# Patient Record
Sex: FEMALE | Race: WHITE | Employment: OTHER | URBAN - METROPOLITAN AREA
[De-identification: names, ages, dates, MRNs, and addresses within clinical notes are randomized per-mention and may not be internally consistent; named-entity substitution may affect disease eponyms.]

---

## 2017-09-25 ENCOUNTER — HOSPITAL ENCOUNTER (OUTPATIENT)
Age: 67
Setting detail: OUTPATIENT SURGERY
Discharge: HOME | End: 2017-09-25
Attending: INTERNAL MEDICINE | Admitting: INTERNAL MEDICINE
Payer: MEDICARE

## 2017-09-25 ENCOUNTER — ANESTHESIA EVENT (OUTPATIENT)
Dept: ENDOSCOPY | Age: 67
End: 2017-09-25
Payer: MEDICARE

## 2017-09-25 ENCOUNTER — ANESTHESIA (OUTPATIENT)
Dept: ENDOSCOPY | Age: 67
End: 2017-09-25
Payer: MEDICARE

## 2017-09-25 VITALS
BODY MASS INDEX: 37.99 KG/M2 | SYSTOLIC BLOOD PRESSURE: 167 MMHG | TEMPERATURE: 97.8 F | WEIGHT: 228 LBS | DIASTOLIC BLOOD PRESSURE: 87 MMHG | HEIGHT: 65 IN | RESPIRATION RATE: 18 BRPM | OXYGEN SATURATION: 99 % | HEART RATE: 89 BPM

## 2017-09-25 LAB — GLUCOSE BLD STRIP.AUTO-MCNC: 128 MG/DL (ref 65–100)

## 2017-09-25 PROCEDURE — 76060000032 HC ANESTHESIA 0.5 TO 1 HR: Performed by: INTERNAL MEDICINE

## 2017-09-25 PROCEDURE — 74011000250 HC RX REV CODE- 250

## 2017-09-25 PROCEDURE — 74011000250 HC RX REV CODE- 250: Performed by: ANESTHESIOLOGY

## 2017-09-25 PROCEDURE — 88312 SPECIAL STAINS GROUP 1: CPT | Performed by: INTERNAL MEDICINE

## 2017-09-25 PROCEDURE — 76040000025: Performed by: INTERNAL MEDICINE

## 2017-09-25 PROCEDURE — 74011250636 HC RX REV CODE- 250/636

## 2017-09-25 PROCEDURE — 88305 TISSUE EXAM BY PATHOLOGIST: CPT | Performed by: INTERNAL MEDICINE

## 2017-09-25 PROCEDURE — 82962 GLUCOSE BLOOD TEST: CPT

## 2017-09-25 PROCEDURE — 74011250636 HC RX REV CODE- 250/636: Performed by: ANESTHESIOLOGY

## 2017-09-25 PROCEDURE — 77030009426 HC FCPS BIOP ENDOSC BSC -B: Performed by: INTERNAL MEDICINE

## 2017-09-25 RX ORDER — LIDOCAINE HYDROCHLORIDE 20 MG/ML
INJECTION, SOLUTION EPIDURAL; INFILTRATION; INTRACAUDAL; PERINEURAL AS NEEDED
Status: DISCONTINUED | OUTPATIENT
Start: 2017-09-25 | End: 2017-09-25 | Stop reason: HOSPADM

## 2017-09-25 RX ORDER — PROPOFOL 10 MG/ML
INJECTION, EMULSION INTRAVENOUS
Status: DISCONTINUED | OUTPATIENT
Start: 2017-09-25 | End: 2017-09-25 | Stop reason: HOSPADM

## 2017-09-25 RX ORDER — SODIUM CHLORIDE 0.9 % (FLUSH) 0.9 %
5-10 SYRINGE (ML) INJECTION AS NEEDED
Status: CANCELLED | OUTPATIENT
Start: 2017-09-25

## 2017-09-25 RX ORDER — SODIUM CHLORIDE, SODIUM LACTATE, POTASSIUM CHLORIDE, CALCIUM CHLORIDE 600; 310; 30; 20 MG/100ML; MG/100ML; MG/100ML; MG/100ML
100 INJECTION, SOLUTION INTRAVENOUS CONTINUOUS
Status: CANCELLED | OUTPATIENT
Start: 2017-09-25

## 2017-09-25 RX ORDER — SODIUM CHLORIDE, SODIUM LACTATE, POTASSIUM CHLORIDE, CALCIUM CHLORIDE 600; 310; 30; 20 MG/100ML; MG/100ML; MG/100ML; MG/100ML
100 INJECTION, SOLUTION INTRAVENOUS CONTINUOUS
Status: DISCONTINUED | OUTPATIENT
Start: 2017-09-25 | End: 2017-09-25 | Stop reason: HOSPADM

## 2017-09-25 RX ORDER — PROPOFOL 10 MG/ML
INJECTION, EMULSION INTRAVENOUS AS NEEDED
Status: DISCONTINUED | OUTPATIENT
Start: 2017-09-25 | End: 2017-09-25 | Stop reason: HOSPADM

## 2017-09-25 RX ADMIN — SODIUM CHLORIDE, SODIUM LACTATE, POTASSIUM CHLORIDE, AND CALCIUM CHLORIDE 100 ML/HR: 600; 310; 30; 20 INJECTION, SOLUTION INTRAVENOUS at 09:59

## 2017-09-25 RX ADMIN — FAMOTIDINE 20 MG: 10 INJECTION, SOLUTION INTRAVENOUS at 10:15

## 2017-09-25 RX ADMIN — LIDOCAINE HYDROCHLORIDE 100 MG: 20 INJECTION, SOLUTION EPIDURAL; INFILTRATION; INTRACAUDAL; PERINEURAL at 11:29

## 2017-09-25 RX ADMIN — PROPOFOL 200 MCG/KG/MIN: 10 INJECTION, EMULSION INTRAVENOUS at 11:29

## 2017-09-25 RX ADMIN — PROPOFOL 50 MG: 10 INJECTION, EMULSION INTRAVENOUS at 11:29

## 2017-09-25 NOTE — H&P
Gastroenterology Outpatient History and Physical    Patient: Mckenzie Guerrier    Physician: Catalina Bhatti MD    Vital Signs: Blood pressure (!) 175/94, pulse 90, temperature 97.8 °F (36.6 °C), resp. rate 16, height 5' 5\" (1.651 m), weight 103.4 kg (228 lb), SpO2 97 %. Allergies: Allergies   Allergen Reactions    Pcn [Penicillins] Anaphylaxis    Sulfa (Sulfonamide Antibiotics) Other (comments)     Gray-Bob syndrome    Advil [Ibuprofen] Nausea Only     Rash    Celebrex [Celecoxib] Shortness of Breath    Pneumovax 23 [Pneumococcal 23-Pallavi Ps Vaccine] Swelling       Chief Complaint: GERD, Epigastric pain, dysphagia  rectal pain, Fe deficiency. Altered BMs, Hx of colon polyps    History of Present Illness: As Above    Justification for Procedure: As Above    History:  Past Medical History:   Diagnosis Date    Anemia     iron deficiency. getting iron infusions--Dr. Ambar Carreno Arrhythmia     pt states \" fast HR\"--but unsure if anything else--- followed by [facility] cardio    Chronic pain     COPD (chronic obstructive pulmonary disease) ([facility] [address] \"years\"    centrilobular emphysema with air trapping. daily inhaler--- home O2 prn--- followed by dr Lyudmila Sood [facility]) 2007    type2-- sqbs avg am-- --- no hypo    GERD (gastroesophageal reflux disease) \"years\"    controlled with med    History of echocardiogram 02/02/2017    EF 88-11%, grade 1 diastolic dysfnction. no significant valve disease, normal RV size and function    History of nuclear stress test 08/22/2017    Findings consistent with no ischemia. Diaphragmatic attenuation is noted.  Hyperlipidemia \"years\"    Hypertension     controlled with med    Joint pain     back, right shoulder, knees    Osteoarthritis of right knee 11/30/2010    PAD (peripheral artery disease) ([facility])     Palpitations     Holter monitor 8/16/17--rhythm is sinus tachycardia. Avg. heart rate 106 bpm, minimum is 91 bpm.  Isolated APDs and couplets.   Isolated nonsustained PAT and VPDs. No complex ventricular arrhythmia.  Pneumonia hospitalized May, June 2010    Renal insufficiency     Followed by [facility] (Dr. Judy Nguyen)---- NOT SEEN IN 10 YRS -- PER PT    Sleep apnea     refuses to use CPAP    Thyroid nodule     followed by [facility]      Past Surgical History:   Procedure Laterality Date    ABDOMEN SURGERY PROC UNLISTED  2010    CHOLECYSTECTOMY    HC BLD CARPEL TUNNEL RELEASE      Bilateral    HX BREAST AUGMENTATION  2005    reduction    HX GYN      Chronic UTIs, Has had multiple cystoscopes    HX HYSTERECTOMY      HX ORTHOPAEDIC  1991    lt. broken foot repair/metal plate-pins    HX ORTHOPAEDIC  2009    RIGHTKNEE REPLACEMENT    HX SALPINGO-OOPHORECTOMY  1990s    HX TOTAL ABDOMINAL HYSTERECTOMY  1970s    HX UROLOGICAL  1990s    bladder tacking    LAP,CHOLECYSTECTOMY  2001    REPAIR OF RECTOCELE  1990s      Social History     Social History    Marital status:      Spouse name: N/A    Number of children: N/A    Years of education: N/A     Social History Main Topics    Smoking status: Former Smoker     Packs/day: 0.50     Years: 30.00     Types: Cigarettes     Quit date: 1/18/1995    Smokeless tobacco: Never Used    Alcohol use No    Drug use: No    Sexual activity: Not Asked     Other Topics Concern    None     Social History Narrative      Family History   Problem Relation Age of Onset    Heart Disease Father     Cancer Father     Malignant Hyperthermia Neg Hx     Pseudocholinesterase Deficiency Neg Hx     Delayed Awakening Neg Hx     Post-op Nausea/Vomiting Neg Hx     Emergence Delirium Neg Hx     Post-op Cognitive Dysfunction Neg Hx     Other Neg Hx        Medications:   Prior to Admission medications    Medication Sig Start Date End Date Taking? Authorizing Provider   allopurinol (ZYLOPRIM) 100 mg tablet Take 100 mg by mouth every morning.    Yes Historical Provider   fluticasone (FLONASE) 50 mcg/actuation nasal spray 2 Sprays by Both Nostrils route two (2) times a day. Yes Historical Provider   Iron Fum & PS Cmp-FA-Vit C-B3 (INTEGRA F) 125-1-40-3 mg cap Take 1 Tab by mouth two (2) times a day. Yes Historical Provider   isosorbide dinitrate (ISORDIL) 30 mg tablet Take 20 mg by mouth every morning. Yes Historical Provider   montelukast (SINGULAIR) 5 mg chewable tablet Take 5 mg by mouth every morning. Pt states she takes a childrens dose of 5 mg daily   Yes Historical Provider   amLODIPine (NORVASC) 5 mg tablet Take 5 mg by mouth every morning. Yes Historical Provider   tiotropium (SPIRIVA WITH HANDIHALER) 18 mcg inhalation capsule Take 1 Cap by inhalation every morning. Yes Historical Provider   levalbuterol tartrate (XOPENEX HFA) 45 mcg/actuation inhaler Take 2 Puffs by inhalation two (2) times a day. Yes Historical Provider   HYDROcodone-acetaminophen (NORCO)  mg tablet Take 1 Tab by mouth three (3) times daily. Yes Historical Provider   Omeprazole delayed release (PRILOSEC D/R) 20 mg tablet Take 40 mg by mouth every morning. Indications: gastroesophageal reflux disease   Yes Historical Provider   furosemide (LASIX) 40 mg tablet Take 20 mg by mouth every morning. 4/18/10  Yes Historical Provider   OTHER,NON-FORMULARY, Take 150 mg by mouth every twenty-eight (28) days. Xolair 150 mg, subcutaneous every 4 weeks  Indications: FOR COPD 4/17/10  Yes Historical Provider   dilTIAZem (CARDIZEM) 30 mg tablet Take 30 mg by mouth two (2) times a day. Historical Provider   metFORMIN (GLUCOPHAGE) 500 mg tablet Take 500 mg by mouth two (2) times daily (with meals). Historical Provider   albuterol (PROVENTIL HFA, VENTOLIN HFA, PROAIR HFA) 90 mcg/actuation inhaler Take 2 Puffs by inhalation every four (4) hours as needed for Wheezing.     Historical Provider       Physical Exam:         Heart: regular rate and rhythm, S1, S2 normal, no murmur, click, rub or gallop   Lungs: chest clear, no wheezing, rales, normal symmetric air entry, Heart exam - S1, S2 normal, no murmur, no gallop, rate regular   Abdominal: Bowel sounds are normal, liver is not enlarged, spleen is not enlarged           Findings/Diagnosis:  Dysphagia, epigastric pain, Fe deficiency, Hx of colon polyps, Altered BMs        Signed:  Tahira Hernandez MD 9/25/2017

## 2017-09-25 NOTE — PROCEDURES
[facility]   PROCEDURE NOTE       Name:  Chitra Whatley   MR#:  [id number]   :  1950   Account #:  [de-identified]   Date of Adm:  2017       DATE OF PROCEDURE: 2017    REFERRING PHYSICIAN: Chaya Conner MD.     PROCEDURE: Esophagogastroduodenoscopy. PREOPERATIVE DIAGNOSES: Dysphagia, gastroesophageal reflux   disease, epigastric pain and iron deficiency. POSTOPERATIVE DIAGNOSIS: Mild gastritis noted, otherwise   negative examination. ANESTHESIA: TIVA. INSTRUMENT: GIF-H190. FINDINGS: The patient was in the EGD position. Using standard   technique, the upper endoscope was inserted and passed to the   hypopharynx. This area appeared normal. The esophagus was easily   entered. All views in the esophagus were normal without ulcers,   erosions or varices. At the end of the procedure. The esophagus   was dilated empirically with a 56-[place] Yost dilator without   difficulty. The gastric lumen was, there was mild gastritis. Biopsies were taken mostly in the antrum. Retroflexed view   showed no varices and there were no unusual masses, no   significant retained liquid or debris. The pylorus was patent. The Duodenal bulb, second and proximal third portion appeared   normal. Small bowel biopsies were taken to evaluate the iron   deficiency anemia to look for celiac. The scope was retracted. Air was taken on the way back. The patient tolerated the   procedure well. Blood loss was minimal.      SUGGEST:   1. Followup biopsies. 2. Continue omeprazole 40 mg a day.               MD NATY Zaman / Dariana Wood   D:  2017   12:07   T:  2017   12:47   Job #:  [id number]

## 2017-09-25 NOTE — DISCHARGE INSTRUCTIONS
Gastrointestinal Esophagogastroduodenoscopy (EGD) - Upper Exam Discharge Instructions    1. Call Dr. Nicholas Figueroa at [phone] for any problems or questions. 2. Contact the doctor's office for follow up appointment as directed. 3. Medication may cause drowsiness for several hours, therefore, do not drive or operate machinery for remainder of the day. 4. No alcohol today. 5. Ordinarily, you may resume regular diet and activity after exam unless otherwise  specified by your physician. 6. For mild soreness in your throat you may use  throat lozenges or warm  salt-water gargles as needed. Any additional instructions: Follow up as needed       Gastrointestinal Colonoscopy/Flexible Sigmoidoscopy - Lower Exam Discharge Instructions  1. Call Dr. Nicholas Figueroa at [phone] for any problems or questions. 2. Contact the doctors office for follow up appointment as directed  3. Medication may cause drowsiness for several hours, therefore, do not drive or operate machinery for remainder of the day. 4. No alcohol today. 5. Ordinarily, you may resume regular diet and activity after exam unless otherwise specified by your physician. 6. Because of air put into your colon during exam, you may experience some abdominal distension, relieved by the passage of gas, for several hours. 7. Contact your physician if you have any of the following:  a. Excessive amount of bleeding - large amount when having a bowel movement. Occasional specks of blood with bowel movement would not be unusual.  b. Severe abdominal pain  c. Fever or Chills    Any additional instructions:   Follow up as needed

## 2017-09-25 NOTE — IP AVS SNAPSHOT
[facility] 
 
 
 [address] 
[phone] Patient: Israel Drake MRN: [id number] OCM:0/30/5770 You are allergic to the following Allergen Reactions Pcn (Penicillins) Anaphylaxis Sulfa (Sulfonamide Antibiotics) Other (comments) Gray-Bob syndrome Advil (Ibuprofen) Nausea Only Rash Celebrex (Celecoxib) Shortness of Breath Pneumovax 23 (Pneumococcal 23-Pallavi Ps Vaccine) Swelling Recent Documentation Height Weight BMI OB Status Smoking Status 1.651 m 103.4 kg 37.94 kg/m2 Postmenopausal Former Smoker Emergency Contacts Name Discharge Info Relation Home Work Mobile Warner Fierro  Spouse [3] [phone] About your hospitalization You were admitted on:  September 25, 2017 You last received care in the:  [facility] You were discharged on:  September 25, 2017 Unit phone number:  [phone] Why you were hospitalized Your primary diagnosis was:  Not on File Providers Seen During Your Hospitalizations Provider Role Specialty Primary office phone Velia Isidro MD Attending Provider Gastroenterology [phone] Your Primary Care Physician (PCP) Primary Care Physician Office Phone Office Fax Jomar Wilson [phone] Follow-up Information None Current Discharge Medication List  
  
ASK your doctor about these medications Dose & Instructions Dispensing Information Comments Morning Noon Evening Bedtime  
 albuterol 90 mcg/actuation inhaler Commonly known as:  PROVENTIL HFA, VENTOLIN HFA, PROAIR HFA Your last dose was: Your next dose is:    
   
   
 Dose:  2 Puff Take 2 Puffs by inhalation every four (4) hours as needed for Wheezing. Refills:  0  
     
   
   
   
  
 allopurinol 100 mg tablet Commonly known as:  Avelina Minor  
   
 Your last dose was: Your next dose is:    
   
   
 Dose:  100 mg Take 100 mg by mouth every morning. Refills:  0 CARDIZEM 30 mg tablet Generic drug:  dilTIAZem Your last dose was: Your next dose is:    
   
   
 Dose:  30 mg Take 30 mg by mouth two (2) times a day. Refills:  0  
     
   
   
   
  
 FLONASE 50 mcg/actuation nasal spray Generic drug:  fluticasone Your last dose was: Your next dose is:    
   
   
 Dose:  2 Spray 2 Sprays by Both Nostrils route two (2) times a day. Refills:  0 HYDROcodone-acetaminophen  mg tablet Commonly known as:  Tevin Handler Your last dose was: Your next dose is:    
   
   
 Dose:  1 Tab Take 1 Tab by mouth three (3) times daily. Refills:  0 INTEGRA F 125-1-40-3 mg Cap Generic drug:  Iron Fum & PS Cmp-FA-Vit C-B3 Your last dose was: Your next dose is:    
   
   
 Dose:  1 Tab Take 1 Tab by mouth two (2) times a day. Refills:  0  
     
   
   
   
  
 isosorbide dinitrate 30 mg tablet Commonly known as:  ISORDIL Your last dose was: Your next dose is:    
   
   
 Dose:  20 mg Take 20 mg by mouth every morning. Refills:  0  
     
   
   
   
  
 LASIX 40 mg tablet Generic drug:  furosemide Your last dose was: Your next dose is:    
   
   
 Dose:  20 mg Take 20 mg by mouth every morning. Refills:  0  
     
   
   
   
  
 metFORMIN 500 mg tablet Commonly known as:  GLUCOPHAGE Your last dose was: Your next dose is:    
   
   
 Dose:  500 mg Take 500 mg by mouth two (2) times daily (with meals). Refills:  0  
     
   
   
   
  
 montelukast 5 mg chewable tablet Commonly known as:  SINGULAIR Your last dose was: Your next dose is:    
   
   
 Dose:  5 mg Take 5 mg by mouth every morning.  Pt states she takes a childrens dose of 5 mg daily Refills:  0 NORVASC 5 mg tablet Generic drug:  amLODIPine Your last dose was: Your next dose is:    
   
   
 Dose:  5 mg Take 5 mg by mouth every morning. Refills:  0 Omeprazole delayed release 20 mg tablet Commonly known as:  PRILOSEC D/R Your last dose was: Your next dose is:    
   
   
 Dose:  40 mg Take 40 mg by mouth every morning. Indications: gastroesophageal reflux disease Refills:  0  
     
   
   
   
  
 OTHER(NON-FORMULARY) Your last dose was: Your next dose is:    
   
   
 Dose:  150 mg Take 150 mg by mouth every twenty-eight (28) days. Xolair 150 mg, subcutaneous every 4 weeks  Indications: FOR COPD Refills:  0 SPIRIVA WITH HANDIHALER 18 mcg inhalation capsule Generic drug:  tiotropium Your last dose was: Your next dose is:    
   
   
 Dose:  1 Cap Take 1 Cap by inhalation every morning. Refills:  0  
     
   
   
   
  
 XOPENEX HFA 45 mcg/actuation inhaler Generic drug:  levalbuterol tartrate Your last dose was: Your next dose is:    
   
   
 Dose:  2 Puff Take 2 Puffs by inhalation two (2) times a day. Refills:  0 Discharge Instructions Gastrointestinal Esophagogastroduodenoscopy (EGD) - Upper Exam Discharge Instructions 1. Call Dr. Nicholas Figueroa at [phone] for any problems or questions. 2. Contact the doctor's office for follow up appointment as directed. 3. Medication may cause drowsiness for several hours, therefore, do not drive or operate machinery for remainder of the day. 4. No alcohol today. 5. Ordinarily, you may resume regular diet and activity after exam unless otherwise  specified by your physician. 6. For mild soreness in your throat you may use  throat lozenges or warm  salt-water gargles as needed. Any additional instructions: Follow up as needed Gastrointestinal Colonoscopy/Flexible Sigmoidoscopy - Lower Exam Discharge Instructions 1. Call Dr. Tali Partida at [phone] for any problems or questions. 2. Contact the doctors office for follow up appointment as directed 3. Medication may cause drowsiness for several hours, therefore, do not drive or operate machinery for remainder of the day. 4. No alcohol today. 5. Ordinarily, you may resume regular diet and activity after exam unless otherwise specified by your physician. 6. Because of air put into your colon during exam, you may experience some abdominal distension, relieved by the passage of gas, for several hours. 7. Contact your physician if you have any of the following: 
a. Excessive amount of bleeding  large amount when having a bowel movement. Occasional specks of blood with bowel movement would not be unusual. 
b. Severe abdominal pain 
c. Fever or Chills Any additional instructions: Follow up as needed Discharge Orders None Introducing [facility]! Josr Chamberlain introduces [employer] patient portal. Now you can access parts of your medical record, email your doctor's office, and request medication refills online. 1. In your internet browser, go to [email or web address] ([email or web address] ([email or web address] mi) 2. Click on the First Time User? Click Here link in the Sign In box. You will see the New Member Sign Up page. 3. Enter your [employer] Access Code exactly as it appears below. You will not need to use this code after youve completed the sign-up process. If you do not sign up before the expiration date, you must request a new code. · [employer] Access Code: [email or web address] Expires: 12/20/2017 11:07 AM 
 
4. Enter the last four digits of your Social Security Number (xxxx) and Date of Birth (mm/dd/yyyy) as indicated and click Submit. You will be taken to the next sign-up page. 5. Create a [employer] ID. This will be your [employer] login ID and cannot be changed, so think of one that is secure and easy to remember. 6. Create a [employer] password. You can change your password at any time. 7. Enter your Password Reset Question and Answer. This can be used at a later time if you forget your password. 8. Enter your e-mail address. You will receive e-mail notification when new information is available in [address] 9. Click Sign Up. You can now view and download portions of your medical record. 10. Click the Download Summary menu link to download a portable copy of your medical information. If you have questions, please visit the Frequently Asked Questions section of the [employer] website. Remember, [employer] is NOT to be used for urgent needs. For medical emergencies, dial 911. Now available from your iPhone and Android! General Information Please provide this summary of care documentation to your next provider. Patient Signature:  ____________________________________________________________ Date:  ____________________________________________________________  
  
Rivera Gould Provider Signature:  ____________________________________________________________ Date:  ____________________________________________________________

## 2017-09-25 NOTE — ROUTINE PROCESS
Pt. Discharged to car by Sandra Soares with family . Vital signs stable. Able to tolerate PO fluids. Passing gas.  Seen by MD.

## 2017-09-25 NOTE — PROCEDURES
[facility]   PROCEDURE NOTE       Name:  Albert Brody   MR#:  [id number]   :  1950   Account #:  [de-identified]   Date of Adm:  2017       DATE OF PROCEDURE: 2017     REFERRING PHYSICIAN: Maurilio Thao MD.    PROCEDURE: Colonoscopy. PREOPERATIVE DIAGNOSES: Iron deficiency anemia, history of   polyps, altered bowel movements. POSTOPERATIVE DIAGNOSES:    1. Diverticulosis extending 3/4 of the way around the colon. 2. Benign appearing submucosal nodule/polyp in the ascending   colon, biopsied. 3. Internal hemorrhoids. No source of iron deficiency anemia   identified. PROCEDURE: After informed consent, the pediatric colonoscope was   lubricated and inserted into the anal canal after an otherwise   normal rectal examination. The rectal mucosa appeared normal. In   the sigmoid and extending to the hepatic flexure were   diverticulosis, but the mucosa in between appeared normal. There   were no inflammatory changes. I reached the right colon. In the   proximal ascending colon was a submucosal 3 mm nodule that was   cold biopsied away. The cecum had a normal appearance. I   identified the appendiceal orifice. The terminal ileum was not   entered. The scope was then retracted. Air was taken on the way   back. No other pathology was identified. Blood loss was minimal.    SUGGESTIONS:   1. Follow up all biopsies. 2. Consider small bowel follow through or small bowel studies.         MD NATY Downing / Ming Begum   D:  2017   12:07   T:  2017   12:51   Job #:  [id number]

## 2017-09-25 NOTE — ANESTHESIA PREPROCEDURE EVALUATION
Anesthetic History               Review of Systems / Medical History  Patient summary reviewed, nursing notes reviewed and pertinent labs reviewed    Pulmonary    COPD (Emphysema. Home O2 prn): severe    Sleep apnea: No treatment  Smoker (Former)         Neuro/Psych              Cardiovascular    Hypertension          PAD and hyperlipidemia    Exercise tolerance: <4 METS  Comments: TTE 0/6/38:  Mild diastolic dysfunction w LVEF 55-65%.      GI/Hepatic/Renal     GERD: well controlled    Renal disease: CRI       Endo/Other    Diabetes: well controlled, type 2  Hypothyroidism: well controlled  Obesity and arthritis     Other Findings   Comments: Severe anemia         Physical Exam    Airway  Mallampati: II  TM Distance: 4 - 6 cm  Neck ROM: decreased range of motion   Mouth opening: Normal     Cardiovascular  Regular rate and rhythm,  S1 and S2 normal,  no murmur, click, rub, or gallop             Dental         Pulmonary      Decreased breath sounds: bilateral           Abdominal  GI exam deferred       Other Findings            Anesthetic Plan    ASA: 3  Anesthesia type: total IV anesthesia            Anesthetic plan and risks discussed with: Patient

## 2017-09-25 NOTE — ANESTHESIA POSTPROCEDURE EVALUATION
Post-Anesthesia Evaluation and Assessment    Patient: Cookie Abbott MRN: [id number]  SSN: [id number]    YOB: 1950  Age: 78 y.o. Sex: female       Cardiovascular Function/Vital Signs  Visit Vitals    /87    Pulse 91    Temp 36.6 °C (97.8 °F)    Resp 16    Ht 5' 5\" (1.651 m)    Wt 103.4 kg (228 lb)    SpO2 98%    BMI 37.94 kg/m2       Patient is status post total IV anesthesia anesthesia for Procedure(s):  COLONOSCOPY / BMI=37  ESOPHAGOGASTRODUODENOSCOPY (EGD)  ESOPHAGOGASTRODUODENAL (EGD) BIOPSY  ESOPHAGEAL DILATION  ENDOSCOPIC POLYPECTOMY. Nausea/Vomiting: None    Postoperative hydration reviewed and adequate. Pain:  Pain Scale 1: Numeric (0 - 10) (09/25/17 1216)  Pain Intensity 1: 0 (09/25/17 1216)   Managed    Neurological Status: At baseline    Mental Status and Level of Consciousness: Arousable    Pulmonary Status:   O2 Device: Room air (09/25/17 1216)   Adequate oxygenation and airway patent    Complications related to anesthesia: None    Post-anesthesia assessment completed.  No concerns    Signed By: Jumana Aleman MD     September 25, 2017

## 2018-11-16 ENCOUNTER — APPOINTMENT (OUTPATIENT)
Dept: CT IMAGING | Age: 68
DRG: 345 | End: 2018-11-16
Attending: SURGERY
Payer: MEDICARE

## 2018-11-16 ENCOUNTER — HOSPITAL ENCOUNTER (INPATIENT)
Age: 68
LOS: 1 days | Discharge: OTHER INSTITUTION | DRG: 345 | End: 2018-11-17
Attending: SURGERY | Admitting: SURGERY
Payer: MEDICARE

## 2018-11-16 PROBLEM — R10.9 ABDOMINAL PAIN: Status: ACTIVE | Noted: 2018-11-16

## 2018-11-16 LAB
ALBUMIN SERPL-MCNC: 3.1 G/DL (ref 3.2–4.6)
ALBUMIN/GLOB SERPL: 0.9 {RATIO}
ALP SERPL-CCNC: 83 U/L (ref 50–136)
ALT SERPL-CCNC: 15 U/L (ref 12–65)
ANION GAP SERPL CALC-SCNC: 9 MMOL/L
AST SERPL-CCNC: 14 U/L (ref 15–37)
BASOPHILS # BLD: 0 K/UL (ref 0–0.2)
BASOPHILS NFR BLD: 0 % (ref 0–2)
BILIRUB SERPL-MCNC: 0.2 MG/DL (ref 0.2–1.1)
BUN SERPL-MCNC: 24 MG/DL (ref 8–23)
CALCIUM SERPL-MCNC: 8.7 MG/DL (ref 8.3–10.4)
CHLORIDE SERPL-SCNC: 100 MMOL/L (ref 98–107)
CO2 SERPL-SCNC: 32 MMOL/L (ref 21–32)
CREAT SERPL-MCNC: 1.24 MG/DL (ref 0.6–1)
DIFFERENTIAL METHOD BLD: ABNORMAL
EOSINOPHIL # BLD: 0 K/UL (ref 0–0.8)
EOSINOPHIL NFR BLD: 0 % (ref 0.5–7.8)
ERYTHROCYTE [DISTWIDTH] IN BLOOD BY AUTOMATED COUNT: 17.5 %
EST. AVERAGE GLUCOSE BLD GHB EST-MCNC: 126 MG/DL
GLOBULIN SER CALC-MCNC: 3.5 G/DL (ref 2.3–3.5)
GLUCOSE BLD STRIP.AUTO-MCNC: 159 MG/DL (ref 65–100)
GLUCOSE SERPL-MCNC: 141 MG/DL (ref 65–100)
HBA1C MFR BLD: 6 %
HCT VFR BLD AUTO: 30.8 % (ref 35.8–46.3)
HGB BLD-MCNC: 9.1 G/DL (ref 11.7–15.4)
IMM GRANULOCYTES # BLD: 0.1 K/UL (ref 0–0.5)
IMM GRANULOCYTES NFR BLD AUTO: 1 % (ref 0–5)
LYMPHOCYTES # BLD: 1.1 K/UL (ref 0.5–4.6)
LYMPHOCYTES NFR BLD: 10 % (ref 13–44)
MCH RBC QN AUTO: 27.9 PG (ref 26.1–32.9)
MCHC RBC AUTO-ENTMCNC: 29.5 G/DL (ref 31.4–35)
MCV RBC AUTO: 94.5 FL (ref 79.6–97.8)
MONOCYTES # BLD: 0.8 K/UL (ref 0.1–1.3)
MONOCYTES NFR BLD: 7 % (ref 4–12)
NEUTS SEG # BLD: 9.8 K/UL (ref 1.7–8.2)
NEUTS SEG NFR BLD: 83 % (ref 43–78)
NRBC # BLD: 0 K/UL (ref 0–0.2)
PLATELET # BLD AUTO: 422 K/UL (ref 150–450)
PMV BLD AUTO: 10.1 FL (ref 9.4–12.3)
POTASSIUM SERPL-SCNC: 3.7 MMOL/L (ref 3.5–5.1)
PROT SERPL-MCNC: 6.6 G/DL
RBC # BLD AUTO: 3.26 M/UL (ref 4.05–5.2)
SODIUM SERPL-SCNC: 141 MMOL/L (ref 136–145)
WBC # BLD AUTO: 11.9 K/UL (ref 4.3–11.1)

## 2018-11-16 PROCEDURE — 74011636320 HC RX REV CODE- 636/320: Performed by: SURGERY

## 2018-11-16 PROCEDURE — 74177 CT ABD & PELVIS W/CONTRAST: CPT

## 2018-11-16 PROCEDURE — 74011636637 HC RX REV CODE- 636/637: Performed by: FAMILY MEDICINE

## 2018-11-16 PROCEDURE — 83036 HEMOGLOBIN GLYCOSYLATED A1C: CPT

## 2018-11-16 PROCEDURE — 85025 COMPLETE CBC W/AUTO DIFF WBC: CPT

## 2018-11-16 PROCEDURE — 74011000258 HC RX REV CODE- 258: Performed by: SURGERY

## 2018-11-16 PROCEDURE — 82962 GLUCOSE BLOOD TEST: CPT

## 2018-11-16 PROCEDURE — 74011250636 HC RX REV CODE- 250/636: Performed by: SURGERY

## 2018-11-16 PROCEDURE — 36415 COLL VENOUS BLD VENIPUNCTURE: CPT

## 2018-11-16 PROCEDURE — 65270000029 HC RM PRIVATE

## 2018-11-16 PROCEDURE — 80053 COMPREHEN METABOLIC PANEL: CPT

## 2018-11-16 RX ORDER — SODIUM CHLORIDE 9 MG/ML
100 INJECTION, SOLUTION INTRAVENOUS CONTINUOUS
Status: DISCONTINUED | OUTPATIENT
Start: 2018-11-16 | End: 2018-11-17 | Stop reason: HOSPADM

## 2018-11-16 RX ORDER — INSULIN LISPRO 100 [IU]/ML
INJECTION, SOLUTION INTRAVENOUS; SUBCUTANEOUS
Status: DISCONTINUED | OUTPATIENT
Start: 2018-11-16 | End: 2018-11-17 | Stop reason: HOSPADM

## 2018-11-16 RX ORDER — SODIUM CHLORIDE 0.9 % (FLUSH) 0.9 %
10 SYRINGE (ML) INJECTION
Status: COMPLETED | OUTPATIENT
Start: 2018-11-16 | End: 2018-11-16

## 2018-11-16 RX ORDER — MORPHINE SULFATE 10 MG/ML
5 INJECTION, SOLUTION INTRAMUSCULAR; INTRAVENOUS
Status: DISCONTINUED | OUTPATIENT
Start: 2018-11-16 | End: 2018-11-17

## 2018-11-16 RX ORDER — ONDANSETRON 2 MG/ML
4 INJECTION INTRAMUSCULAR; INTRAVENOUS
Status: DISCONTINUED | OUTPATIENT
Start: 2018-11-16 | End: 2018-11-17 | Stop reason: HOSPADM

## 2018-11-16 RX ADMIN — INSULIN LISPRO 2 UNITS: 100 INJECTION, SOLUTION INTRAVENOUS; SUBCUTANEOUS at 21:33

## 2018-11-16 RX ADMIN — DIATRIZOATE MEGLUMINE AND DIATRIZOATE SODIUM 15 ML: 660; 100 LIQUID ORAL; RECTAL at 18:50

## 2018-11-16 RX ADMIN — MORPHINE SULFATE 5 MG: 10 INJECTION INTRAVENOUS at 19:34

## 2018-11-16 RX ADMIN — SODIUM CHLORIDE 100 ML/HR: 900 INJECTION, SOLUTION INTRAVENOUS at 17:00

## 2018-11-16 RX ADMIN — IOPAMIDOL 100 ML: 755 INJECTION, SOLUTION INTRAVENOUS at 18:50

## 2018-11-16 RX ADMIN — SODIUM CHLORIDE 100 ML: 900 INJECTION, SOLUTION INTRAVENOUS at 18:50

## 2018-11-16 RX ADMIN — Medication 10 ML: at 18:50

## 2018-11-16 NOTE — PROGRESS NOTES
Direct admission assessment completed. Pt is alert and oriented x 4, lungs diminished, sob with exertion. Has congested productive cough. Wears 02 at [place] at hospital and home. Skin warm, dry and intact. Currently NPO. Verbalizes needs well. abd is distended and tender in places. Safety measures in place. Continue to monitor.

## 2018-11-16 NOTE — CONSULTS
HOSPITALIST H&P/CONSULT  NAME:  Viktor Hermosillo   Age:  70 y.o.  :   1950   MRN:   [id number]  PCP: Jeffrey Choudhary MD  Consulting MD:  Treatment Team: Attending Provider: Clayton Nguyen MD; Consulting Provider: Ronnie Kaur DO  HPI:   Patient is a 26GR with oxygen dependant COPD, DM, and HTN who is admitted today by surgery for SBO. She was treated at outside facility for constipation earlier this week but worsened and was found to have XR evidence of SBO. She is currently awaiting CT scan abdomen. Her breathing is generally at baseline, but the pain takes her breath away sometimes. At home, she wears 2L inconsistently, sometimes feeling alright without it at rest.  She takes metformin 500 BID for DM, reports good FSBS control at home. She confirms her BP medications as entered in the chart. She denies history of leg swelling or heart failure. She sees hematology and is dependent on IV iron for EMMANUELLE. She states they have not found the cause of her anemia, states her last hbg was 8. Complete ROS done and is as stated in HPI or otherwise negative  Past Medical History:   Diagnosis Date    Anemia     iron deficiency. getting iron infusions--Dr. Veronica Seth Arrhythmia     pt states \" fast HR\"--but unsure if anything else--- followed by [facility] cardio    Chronic pain     COPD (chronic obstructive pulmonary disease) ([facility] [address] \"years\"    centrilobular emphysema with air trapping. daily inhaler--- home O2 prn--- followed by dr Sanjeev Allison [facility])     type2-- sqbs avg am-- --- no hypo    GERD (gastroesophageal reflux disease) \"years\"    controlled with med    History of echocardiogram 2017    EF 70-31%, grade 1 diastolic dysfnction. no significant valve disease, normal RV size and function    History of nuclear stress test 2017    Findings consistent with no ischemia. Diaphragmatic attenuation is noted.     Hyperlipidemia \"years\"    Hypertension controlled with med    Joint pain     back, right shoulder, knees    Osteoarthritis of right knee 11/30/2010    [facility]AD [facility]peripheral artery disease) ([facility])     Palpitations     Holter monitor 8/16/17--rhythm is sinus tachycardia. Avg. heart rate 106 bpm, minimum is 91 bpm.  Isolated APDs and couplets. Isolated nonsustained PAT and VPDs. No complex ventricular arrhythmia.  Pneumonia hospitalized May, June 2010    Renal insufficiency     Followed by [facility] (Dr. Carina Bolaños)---- NOT SEEN IN 10 YRS -- PER PT    Sleep apnea     refuses to use CPAP    Thyroid nodule     followed by [facility]      Past Surgical History:   Procedure Laterality Date    ABDOMEN SURGERY PROC UNLISTED  2010    CHOLECYSTECTOMY    HC BLD CARPEL TUNNEL RELEASE      Bilateral    HX BREAST AUGMENTATION  2005    reduction    HX GYN      Chronic UTIs, Has had multiple cystoscopes    HX HYSTERECTOMY      HX ORTHOPAEDIC  1991    lt. broken foot repair/metal plate-pins    HX ORTHOPAEDIC  2009    RIGHTKNEE REPLACEMENT    HX SALPINGO-OOPHORECTOMY  1990s    HX TOTAL ABDOMINAL HYSTERECTOMY  1970s    HX UROLOGICAL  1990s    bladder tacking    LAP,CHOLECYSTECTOMY  2001    REPAIR OF RECTOCELE  1990s      Prior to Admission Medications   Prescriptions Last Dose Informant Patient Reported? Taking? HYDROcodone-acetaminophen (NORCO)  mg tablet   Yes No   Sig: Take 1 Tab by mouth three (3) times daily. Iron Fum & PS Cmp-FA-Vit C-B3 (INTEGRA F) 125-1-40-3 mg cap   Yes No   Sig: Take 1 Tab by mouth two (2) times a day. OTHER,NON-FORMULARY,   Yes No   Sig: Take 150 mg by mouth every twenty-eight (28) days. Xolair 150 mg, subcutaneous every 4 weeks  Indications: FOR COPD   Omeprazole delayed release (PRILOSEC D/R) 20 mg tablet   Yes No   Sig: Take 40 mg by mouth every morning.  Indications: gastroesophageal reflux disease   albuterol (PROVENTIL HFA, VENTOLIN HFA, PROAIR HFA) 90 mcg/actuation inhaler   Yes No   Sig: Take 2 Puffs by inhalation every four (4) hours as needed for Wheezing. allopurinol (ZYLOPRIM) 100 mg tablet   Yes No   Sig: Take 100 mg by mouth every morning. amLODIPine (NORVASC) 5 mg tablet   Yes No   Sig: Take 5 mg by mouth every morning. dilTIAZem (CARDIZEM) 30 mg tablet   Yes No   Sig: Take 30 mg by mouth two (2) times a day. fluticasone (FLONASE) 50 mcg/actuation nasal spray   Yes No   Si Sprays by Both Nostrils route two (2) times a day. furosemide (LASIX) 40 mg tablet   Yes No   Sig: Take 20 mg by mouth every morning. isosorbide dinitrate (ISORDIL) 30 mg tablet   Yes No   Sig: Take 20 mg by mouth every morning. levalbuterol tartrate (XOPENEX HFA) 45 mcg/actuation inhaler   Yes No   Sig: Take 2 Puffs by inhalation two (2) times a day. metFORMIN (GLUCOPHAGE) 500 mg tablet   Yes No   Sig: Take 500 mg by mouth two (2) times daily (with meals). montelukast (SINGULAIR) 5 mg chewable tablet   Yes No   Sig: Take 5 mg by mouth every morning. Pt states she takes a childrens dose of 5 mg daily   tiotropium (SPIRIVA WITH HANDIHALER) 18 mcg inhalation capsule   Yes No   Sig: Take 1 Cap by inhalation every morning.       Facility-Administered Medications: None     Allergies   Allergen Reactions    Pcn [Penicillins] Anaphylaxis    Sulfa (Sulfonamide Antibiotics) Other (comments)     Gray-Bob syndrome    Advil [Ibuprofen] Nausea Only     Rash    Celebrex [Celecoxib] Shortness of Breath    Pneumovax 23 [Pneumococcal 23-Pallavi Ps Vaccine] Swelling      Social History     Tobacco Use    Smoking status: Former Smoker     Packs/day: 0.50     Years: 30.00     Pack years: 15.00     Types: Cigarettes     Last attempt to quit: 1995     Years since quittin.8    Smokeless tobacco: Never Used   Substance Use Topics    Alcohol use: No      Family History   Problem Relation Age of Onset    Heart Disease Father     Cancer Father     Malignant Hyperthermia Neg Hx     Pseudocholinesterase Deficiency Neg Hx  Delayed Awakening Neg Hx     Post-op Nausea/Vomiting Neg Hx     Emergence Delirium Neg Hx     Post-op Cognitive Dysfunction Neg Hx     Other Neg Hx       Objective:     Visit Vitals  /86 (BP Patient Position: At rest)   Pulse (!) 106   Temp 97.5 °F (36.4 °C)   Resp 18   Ht 5' 5\" (1.651 m)   Wt 99.3 kg (219 lb)   SpO2 96%   Breastfeeding? No   BMI 36.44 kg/m²      Temp (24hrs), Av.5 °F (36.4 °C), Min:97.5 °F (36.4 °C), Max:97.5 °F (36.4 °C)    Oxygen Therapy  O2 Sat (%): 96 % (18 1537)  Physical Exam:  General:    Alert, cooperative, mild distress from pain  Head:   Normocephalic, without obvious abnormality, atraumatic. Nose:  Nares normal. No drainage or sinus tenderness. Lungs:   Clear to auscultation bilaterally. No Wheezing or Rhonchi. No rales. Heart:   Regular rate and rhythm,  no murmur, rub or gallop. Abdomen:   +BS, diffusely tender  Extremities: No cyanosis. No edema. No clubbing  Skin:     Texture, turgor normal. No rashes or lesions. Not Jaundiced  Neurologic: Alert and oriented x 3, no focal deficits   Data Review:   Recent Results (from the past 24 hour(s))   CBC WITH AUTOMATED DIFF    Collection Time: 18  5:14 PM   Result Value Ref Range    WBC 11.9 (H) 4.3 - 11.1 K/uL    RBC 3.26 (L) 4.05 - 5.2 M/uL    HGB 9.1 (L) 11.7 - 15.4 g/dL    HCT 30.8 (L) 35.8 - 46.3 %    MCV 94.5 79.6 - 97.8 FL    MCH 27.9 26.1 - 32.9 PG    MCHC 29.5 (L) 31.4 - 35.0 g/dL    RDW 17.5 %    PLATELET 424 119 - 671 K/uL    MPV 10.1 9.4 - 12.3 FL    ABSOLUTE NRBC 0.00 0.0 - 0.2 K/uL    DF AUTOMATED      NEUTROPHILS 83 (H) 43 - 78 %    LYMPHOCYTES 10 (L) 13 - 44 %    MONOCYTES 7 4.0 - 12.0 %    EOSINOPHILS 0 (L) 0.5 - 7.8 %    BASOPHILS 0 0.0 - 2.0 %    IMMATURE GRANULOCYTES 1 0.0 - 5.0 %    ABS. NEUTROPHILS 9.8 (H) 1.7 - 8.2 K/UL    ABS. LYMPHOCYTES 1.1 0.5 - 4.6 K/UL    ABS. MONOCYTES 0.8 0.1 - 1.3 K/UL    ABS. EOSINOPHILS 0.0 0.0 - 0.8 K/UL    ABS. BASOPHILS 0.0 0.0 - 0.2 K/UL    ABS. IMM. GRANS. 0.1 0.0 - 0.5 K/UL   METABOLIC PANEL, COMPREHENSIVE    Collection Time: 11/16/18  5:14 PM   Result Value Ref Range    Sodium 141 136 - 145 mmol/L    Potassium 3.7 3.5 - 5.1 mmol/L    Chloride 100 98 - 107 mmol/L    CO2 32 21 - 32 mmol/L    Anion gap 9 mmol/L    Glucose 141 (H) 65 - 100 mg/dL    BUN 24 (H) 8 - 23 MG/DL    Creatinine 1.24 (H) 0.6 - 1.0 MG/DL    GFR est AA 55 (L) >60 ml/min/1.73m2    GFR est non-AA 46 ml/min/1.73m2    Calcium 8.7 8.3 - 10.4 MG/DL    Bilirubin, total 0.2 0.2 - 1.1 MG/DL    ALT (SGPT) 15 12 - 65 U/L    AST (SGOT) 14 (L) 15 - 37 U/L    Alk. phosphatase 83 50 - 136 U/L    Protein, total 6.6 g/dL    Albumin 3.1 (L) 3.2 - 4.6 g/dL    Globulin 3.5 2.3 - 3.5 g/dL    A-G Ratio 0.9       Imaging /Procedures /Studies   CT ABD PELV W CONT    (Results Pending)       Assessment and Plan: Active Hospital Problems    Diagnosis Date Noted    Abdominal pain 11/16/2018    COPD (chronic obstructive pulmonary disease) ([facility] [address] 04/18/2010    Iron deficiency anemia 04/18/2010     Received transfusion 4/17      HTN (hypertension) 04/18/2010    DM (diabetes mellitus) ([facility] [address] 04/18/2010       PLAN:  SBO: per surgery  DM: hold home metformin, add corrective insulin if needed  COPD: not in exacerbation. Home meds and supplemental O2  Anemia: appears at baseline. Monitor. HTN: continue cardizem, norvasc, monitor pressure. Can restart home lasix and isordil if needed    Thank you for allowing us to be involved in this patient's care, we will continue to follow along.     Signed By: Lyndon Schmidt MD     November 16, 2018

## 2018-11-17 ENCOUNTER — HOSPITAL ENCOUNTER (INPATIENT)
Age: 68
LOS: 3 days | Discharge: HOME | DRG: 345 | End: 2018-11-20
Attending: SURGERY | Admitting: SURGERY
Payer: MEDICARE

## 2018-11-17 VITALS
OXYGEN SATURATION: 94 % | TEMPERATURE: 98.4 F | RESPIRATION RATE: 16 BRPM | SYSTOLIC BLOOD PRESSURE: 144 MMHG | HEART RATE: 97 BPM | HEIGHT: 65 IN | WEIGHT: 219 LBS | DIASTOLIC BLOOD PRESSURE: 81 MMHG | BODY MASS INDEX: 36.49 KG/M2

## 2018-11-17 LAB
GLUCOSE BLD STRIP.AUTO-MCNC: 132 MG/DL (ref 65–100)
GLUCOSE BLD STRIP.AUTO-MCNC: 147 MG/DL (ref 65–100)
GLUCOSE BLD STRIP.AUTO-MCNC: 148 MG/DL (ref 65–100)

## 2018-11-17 PROCEDURE — 74011000250 HC RX REV CODE- 250: Performed by: SURGERY

## 2018-11-17 PROCEDURE — 94760 N-INVAS EAR/PLS OXIMETRY 1: CPT

## 2018-11-17 PROCEDURE — 74011250637 HC RX REV CODE- 250/637: Performed by: SURGERY

## 2018-11-17 PROCEDURE — 77010033678 HC OXYGEN DAILY

## 2018-11-17 PROCEDURE — 94664 DEMO&/EVAL PT USE INHALER: CPT

## 2018-11-17 PROCEDURE — 74011250636 HC RX REV CODE- 250/636: Performed by: SURGERY

## 2018-11-17 PROCEDURE — 94640 AIRWAY INHALATION TREATMENT: CPT

## 2018-11-17 PROCEDURE — 82962 GLUCOSE BLOOD TEST: CPT

## 2018-11-17 PROCEDURE — 74011250637 HC RX REV CODE- 250/637: Performed by: FAMILY MEDICINE

## 2018-11-17 PROCEDURE — C9113 INJ PANTOPRAZOLE SODIUM, VIA: HCPCS | Performed by: SURGERY

## 2018-11-17 PROCEDURE — 65270000029 HC RM PRIVATE

## 2018-11-17 PROCEDURE — 74011000250 HC RX REV CODE- 250: Performed by: FAMILY MEDICINE

## 2018-11-17 PROCEDURE — 77030020263 HC SOL INJ SOD CL0.9% LFCR 1000ML

## 2018-11-17 RX ORDER — ALBUTEROL SULFATE 0.83 MG/ML
2.5 SOLUTION RESPIRATORY (INHALATION) 3 TIMES DAILY
Status: DISCONTINUED | OUTPATIENT
Start: 2018-11-17 | End: 2018-11-17 | Stop reason: HOSPADM

## 2018-11-17 RX ORDER — DILTIAZEM HYDROCHLORIDE 30 MG/1
30 TABLET, FILM COATED ORAL EVERY 12 HOURS
Status: DISCONTINUED | OUTPATIENT
Start: 2018-11-17 | End: 2018-11-17

## 2018-11-17 RX ORDER — ONDANSETRON 2 MG/ML
4 INJECTION INTRAMUSCULAR; INTRAVENOUS
Status: DISCONTINUED | OUTPATIENT
Start: 2018-11-17 | End: 2018-11-20 | Stop reason: HOSPADM

## 2018-11-17 RX ORDER — INSULIN LISPRO 100 [IU]/ML
INJECTION, SOLUTION INTRAVENOUS; SUBCUTANEOUS
Status: DISCONTINUED | OUTPATIENT
Start: 2018-11-17 | End: 2018-11-20 | Stop reason: HOSPADM

## 2018-11-17 RX ORDER — ALBUTEROL SULFATE 0.83 MG/ML
2.5 SOLUTION RESPIRATORY (INHALATION)
Status: DISCONTINUED | OUTPATIENT
Start: 2018-11-17 | End: 2018-11-17 | Stop reason: HOSPADM

## 2018-11-17 RX ORDER — AMLODIPINE BESYLATE 5 MG/1
5 TABLET ORAL
Status: DISCONTINUED | OUTPATIENT
Start: 2018-11-17 | End: 2018-11-17 | Stop reason: HOSPADM

## 2018-11-17 RX ORDER — LOSARTAN POTASSIUM 50 MG/1
100 TABLET ORAL DAILY
Status: DISCONTINUED | OUTPATIENT
Start: 2018-11-18 | End: 2018-11-17 | Stop reason: HOSPADM

## 2018-11-17 RX ORDER — FUROSEMIDE 20 MG/1
20 TABLET ORAL DAILY
Status: DISCONTINUED | OUTPATIENT
Start: 2018-11-17 | End: 2018-11-17 | Stop reason: HOSPADM

## 2018-11-17 RX ORDER — HYDROMORPHONE HYDROCHLORIDE 1 MG/ML
0.5 INJECTION, SOLUTION INTRAMUSCULAR; INTRAVENOUS; SUBCUTANEOUS
Status: DISCONTINUED | OUTPATIENT
Start: 2018-11-17 | End: 2018-11-20 | Stop reason: HOSPADM

## 2018-11-17 RX ORDER — HYDROMORPHONE HYDROCHLORIDE 2 MG/ML
0.5 INJECTION, SOLUTION INTRAMUSCULAR; INTRAVENOUS; SUBCUTANEOUS
Status: DISCONTINUED | OUTPATIENT
Start: 2018-11-17 | End: 2018-11-17 | Stop reason: HOSPADM

## 2018-11-17 RX ORDER — ALBUTEROL SULFATE 0.83 MG/ML
2.5 SOLUTION RESPIRATORY (INHALATION) 3 TIMES DAILY
Status: DISCONTINUED | OUTPATIENT
Start: 2018-11-17 | End: 2018-11-17

## 2018-11-17 RX ORDER — BUDESONIDE 0.5 MG/2ML
500 INHALANT ORAL
Status: DISCONTINUED | OUTPATIENT
Start: 2018-11-17 | End: 2018-11-17 | Stop reason: HOSPADM

## 2018-11-17 RX ORDER — MORPHINE SULFATE 10 MG/ML
5 INJECTION, SOLUTION INTRAMUSCULAR; INTRAVENOUS
Status: DISCONTINUED | OUTPATIENT
Start: 2018-11-17 | End: 2018-11-20 | Stop reason: HOSPADM

## 2018-11-17 RX ORDER — SODIUM CHLORIDE 9 MG/ML
100 INJECTION, SOLUTION INTRAVENOUS CONTINUOUS
Status: DISCONTINUED | OUTPATIENT
Start: 2018-11-17 | End: 2018-11-20 | Stop reason: HOSPADM

## 2018-11-17 RX ORDER — ALBUTEROL SULFATE 0.83 MG/ML
2.5 SOLUTION RESPIRATORY (INHALATION) 3 TIMES DAILY
Status: DISCONTINUED | OUTPATIENT
Start: 2018-11-17 | End: 2018-11-20 | Stop reason: HOSPADM

## 2018-11-17 RX ORDER — MONTELUKAST SODIUM 10 MG/1
10 TABLET ORAL
Status: DISCONTINUED | OUTPATIENT
Start: 2018-11-17 | End: 2018-11-17 | Stop reason: HOSPADM

## 2018-11-17 RX ADMIN — TIOTROPIUM BROMIDE 18 MCG: 18 CAPSULE ORAL; RESPIRATORY (INHALATION) at 07:50

## 2018-11-17 RX ADMIN — DILTIAZEM HYDROCHLORIDE 30 MG: 30 TABLET, FILM COATED ORAL at 09:23

## 2018-11-17 RX ADMIN — SODIUM CHLORIDE 100 ML/HR: 900 INJECTION, SOLUTION INTRAVENOUS at 12:14

## 2018-11-17 RX ADMIN — ALBUTEROL SULFATE 2.5 MG: 2.5 SOLUTION RESPIRATORY (INHALATION) at 15:04

## 2018-11-17 RX ADMIN — BUDESONIDE 500 MCG: 0.5 INHALANT RESPIRATORY (INHALATION) at 11:07

## 2018-11-17 RX ADMIN — SODIUM PHOSPHATE 1 ENEMA: 7; 19 ENEMA RECTAL at 21:31

## 2018-11-17 RX ADMIN — MONTELUKAST SODIUM 10 MG: 10 TABLET, COATED ORAL at 03:05

## 2018-11-17 RX ADMIN — SODIUM CHLORIDE 100 ML/HR: 900 INJECTION, SOLUTION INTRAVENOUS at 19:03

## 2018-11-17 RX ADMIN — ALBUTEROL SULFATE 2.5 MG: 2.5 SOLUTION RESPIRATORY (INHALATION) at 11:08

## 2018-11-17 RX ADMIN — MORPHINE SULFATE 5 MG: 10 INJECTION INTRAVENOUS at 04:18

## 2018-11-17 RX ADMIN — SODIUM CHLORIDE 100 ML/HR: 900 INJECTION, SOLUTION INTRAVENOUS at 00:02

## 2018-11-17 RX ADMIN — SODIUM PHOSPHATE 1 ENEMA: 7; 19 ENEMA RECTAL at 22:37

## 2018-11-17 RX ADMIN — HYDROMORPHONE HYDROCHLORIDE 0.5 MG: 1 INJECTION, SOLUTION INTRAMUSCULAR; INTRAVENOUS; SUBCUTANEOUS at 19:14

## 2018-11-17 RX ADMIN — HYDROMORPHONE HYDROCHLORIDE 0.5 MG: 2 INJECTION, SOLUTION INTRAMUSCULAR; INTRAVENOUS; SUBCUTANEOUS at 12:08

## 2018-11-17 RX ADMIN — SODIUM CHLORIDE 40 MG: 9 INJECTION, SOLUTION INTRAMUSCULAR; INTRAVENOUS; SUBCUTANEOUS at 09:23

## 2018-11-17 RX ADMIN — FUROSEMIDE 20 MG: 20 TABLET ORAL at 14:56

## 2018-11-17 RX ADMIN — HYDROMORPHONE HYDROCHLORIDE 0.5 MG: 1 INJECTION, SOLUTION INTRAMUSCULAR; INTRAVENOUS; SUBCUTANEOUS at 23:14

## 2018-11-17 RX ADMIN — MORPHINE SULFATE 5 MG: 10 INJECTION INTRAVENOUS at 00:01

## 2018-11-17 RX ADMIN — AMLODIPINE BESYLATE 5 MG: 5 TABLET ORAL at 09:23

## 2018-11-17 NOTE — PROGRESS NOTES
Am assessment completed. Pt is alert and oriented x 3, lungs coarse and wheezy. Verbalizes needs well. Pt is NPO due to possible surgery. Skin warm, dry and intact. Continue to monitor.

## 2018-11-17 NOTE — PROGRESS NOTES
Hospitalist Progress Note    2018  Admit Date: 2018  3:28 PM   NAME: Mariana Mancilla   :  1950   MRN:  [id number]   Attending: Tacho Murphy MD  PCP:  Mark Herrera MD    SUBJECTIVE:   Patient is a 52BR with oxygen dependant COPD, DM, and HTN who is by surgery for abdominal pain, found to have colonic obstruction. Hospitalists consulted for multiple medical conditions. Today, she is complaining of abdominal pain still. She is thirsty. No shortness of breath or chest pain. She is comfortable on her home 2L NC. Sips of fluids only. Also has a pending GI consult. Review of Systems negative with exception of pertinent positives noted above  PHYSICAL EXAM     Visit Vitals  /71 (BP 1 Location: Right arm, BP Patient Position: At rest;Supine)   Pulse 91   Temp 97.3 °F (36.3 °C)   Resp 18   Ht 5' 5\" (1.651 m)   Wt 99.3 kg (219 lb)   SpO2 100%   Breastfeeding? No   BMI 36.44 kg/m²      Temp (24hrs), Av.8 °F (36.6 °C), Min:97.3 °F (36.3 °C), Max:98.3 °F (36.8 °C)    Oxygen Therapy  O2 Sat (%): 100 % (18 1119)  Pulse via Oximetry: 84 beats per minute (18 1108)  O2 Device: Nasal cannula (18 1108)  O2 Flow Rate (L/min): 2 l/min (18 1108)  FIO2 (%): 28 % (18 1108)    Intake/Output Summary (Last 24 hours) at 2018 1239  Last data filed at 2018 1936  Gross per 24 hour   Intake 1203 ml   Output --   Net 1203 ml      General: Distress from pain.   Lungs:  Coarse breath sounds, good air movement  Heart:  Regular rate and rhythm,  No murmur, rub, or gallop  Abdomen: +BS, soft, general tenderness  Extremities: No cyanosis, clubbing or edema  Neurologic:  No focal deficits    ASSESSMENT      Active Hospital Problems    Diagnosis Date Noted    Abdominal pain 2018    COPD (chronic obstructive pulmonary disease) ([facility] [address] 2010    Iron deficiency anemia 2010     Received transfusion       HTN (hypertension) 2010    DM (diabetes mellitus) ([facility] [address] 04/18/2010     Plan:    SBO: per surgery  DM: hold home metformin, add corrective insulin if needed  COPD: not in exacerbation. Home meds and supplemental O2  Anemia: appears at baseline. Monitor.   HTN: continue home meds as appear in 11/1 office visit (different from admission med rec) - losartan for nonformulary irbesartan      Signed By: Radha Hodges MD     November 17, 2018

## 2018-11-17 NOTE — PROGRESS NOTES
Javier Jarrett  Admission Date: 11/16/2018   POD: * No surgery found *            Daily Progress Note: 11/17/2018  Subjective:   Miserable   Generalized abdominal discomfort, no point tenderness   Nausea but no vomiting  Denies any flatus or BM  Objective:   AVSS  Physical Exam:          GEN: well developed and in no acute distress  HEENT:  PERRL, EOMI, no alar flaring or epistaxis, oral mucosa moist without cyanosis,   NECK:  no JVD, no retractions, no thyromegaly or masses  LUNGS:  Coarse wheezing bilaterally   HEART:  RRR  ABDOMEN:  soft, generalized tenderness. No rebound or guarding. No mass or hernia. Quite bowel sounds present  EXTREMITIES:  warm with no cyanosis,  SKIN:  no jaundice or ecchymosis,   NEURO:  alert and oriented, grossly non-focal      LAB  Recent Labs     11/16/18  1714   WBC 11.9*   HGB 9.1*   HCT 30.8*         K 3.7      CO2 32   BUN 24*   CREA 1.24*   *   TBILI 0.2   SGOT 14*   ALT 15   AP 83     CT Results (most recent):  Results from Hospital Encounter encounter on 11/16/18   CT ABD PELV W CONT    Narrative CT OF THE ABDOMEN AND PELVIS WITH CONTRAST. CLINICAL INDICATION: Abdominal pain    PROCEDURE: Serial thin section axial images obtained from the lung bases through  the proximal femurs following the administration of 100 cc of Isovue 370  intravenous contrast.  Radiation dose reduction techniques were used for this  study. Our CT scanners use one or all of the following: Automated exposure  control, adjusted of the mA and/or kV according to patient size, iterative  reconstruction    COMPARISON: CT enterography dated 1/18/2013    FINDINGS:     CT ABDOMEN: Cholecystectomy clips are present. The liver and spleen are normal.  The kidneys are symmetric in size and contrast enhancement. There is no  hydronephrosis. The adrenal glands and pancreas are normal. There is no ascites  or adenopathy.  There is a large amount of stool throughout the cecum, ascending  colon and transverse colon extending to the mid descending colon. There is an  abrupt transition to a decompressed loop of colon in the left lower abdominal  quadrant; however no focal mass appreciated. There is diverticular change along  the distal descending and sigmoid colon. No acute diverticulitis evident. CT PELVIS: The rectum is decompressed. No free fluid accumulating dependently in  the pelvis. The bladder is nondistended. No inguinal hernia or adenopathy. No aggressive osseous lesions identified. Impression IMPRESSION:  1. Large amount of stool throughout the majority of the colon with a sharp  transition between stool-filled colon and decompressed colon in the descending  colon in the left lower abdominal quadrant. No discrete mass appreciated. There  is no adjacent adenopathy. 2. Diverticulosis of the sigmoid colon. No acute diverticulitis. Assessment:     Hospital Problems  Date Reviewed: 9/25/2017          Codes Class Noted POA    Abdominal pain ICD-10-CM: R10.9  ICD-9-CM: 789.00  11/16/2018 Unknown        COPD (chronic obstructive pulmonary disease) ([facility] [address] (Chronic) ICD-10-CM: J44.9  ICD-9-CM: 496  4/18/2010 Yes        Iron deficiency anemia (Chronic) ICD-10-CM: D50.9  ICD-9-CM: 280.9  4/18/2010 Yes    Overview Signed 4/18/2010  5:53 AM by Gray Goldman     Received transfusion 4/17             HTN (hypertension) (Chronic) ICD-10-CM: I10  ICD-9-CM: 401.9  4/18/2010 Yes        DM (diabetes mellitus) ([facility] [address] (Chronic) ICD-10-CM: E11.9  ICD-9-CM: 250.00  4/18/2010 Yes            Plan:     Pt looks to have colon obstruction in mid descending colon.   She reports having had a colonoscopy in Feb of this year that was normal.    Will ask GI to evaluate for diagnostic sigmoidoscopy   May have sips of fluids and meds today if no intervention is to be done today  RT to evaluate for breathing treatments    Bria Houser MD

## 2018-11-17 NOTE — PROGRESS NOTES
TRANSFER - OUT REPORT:    Verbal report given to med surg (name) on Ethan Elder  being transferred to 202 (unit) for ordered procedure       Report consisted of patients Situation, Background, Assessment and   Recommendations(SBAR). Information from the following report(s) SBAR, Kardex, Intake/Output and MAR was reviewed with the receiving nurse. Lines:   Peripheral IV 11/16/18 Anterior; Left Wrist (Active)   Site Assessment Clean, dry, & intact 11/17/2018 11:19 AM   Phlebitis Assessment 0 11/17/2018 11:19 AM   Infiltration Assessment 0 11/17/2018 11:19 AM   Dressing Status Clean, dry, & intact 11/17/2018 11:19 AM   Dressing Type Transparent 11/17/2018 11:19 AM   Hub Color/Line Status Infusing 11/17/2018 11:19 AM        Opportunity for questions and clarification was provided.       Patient transported with:   O2 @ 2 liters

## 2018-11-17 NOTE — PROGRESS NOTES
Problem: Falls - Risk of  Goal: *Absence of Falls  Document Jeremi Fall Risk and appropriate interventions in the flowsheet. Outcome: Progressing Towards Goal  Fall Risk Interventions:            Medication Interventions: Bed/chair exit alarm                  Problem: Pain  Goal: *Control of Pain  Outcome: Progressing Towards Goal  Pt stated pain goal of 3 on scale 0-10.

## 2018-11-17 NOTE — PROGRESS NOTES
Shift assessment complete. Pt resting in bed/family at bedside. A&O x4. Respirations present, even, unlabored. 2 L O2 nasal cannula. HR regular, S1&S2 auscultated. IVF infusing without difficulty. Pt complains of pain 10 on scale 0-10. Medicated with morphine 5 mg IV slowly. Encouraged to call for help when needed. Bed in lowest positron, call light within reach, side rails x3. Will continue to monitor throughout the shift.

## 2018-11-18 ENCOUNTER — ANESTHESIA EVENT (OUTPATIENT)
Dept: ENDOSCOPY | Age: 68
DRG: 345 | End: 2018-11-18
Payer: MEDICARE

## 2018-11-18 ENCOUNTER — ANESTHESIA (OUTPATIENT)
Dept: ENDOSCOPY | Age: 68
DRG: 345 | End: 2018-11-18
Payer: MEDICARE

## 2018-11-18 ENCOUNTER — APPOINTMENT (OUTPATIENT)
Dept: GENERAL RADIOLOGY | Age: 68
DRG: 345 | End: 2018-11-18
Attending: INTERNAL MEDICINE
Payer: MEDICARE

## 2018-11-18 PROBLEM — K56.609 COLON OBSTRUCTION (HCC): Status: ACTIVE | Noted: 2018-11-18

## 2018-11-18 LAB
GLUCOSE BLD STRIP.AUTO-MCNC: 132 MG/DL (ref 65–100)
GLUCOSE BLD STRIP.AUTO-MCNC: 142 MG/DL (ref 65–100)
GLUCOSE BLD STRIP.AUTO-MCNC: 225 MG/DL (ref 65–100)
GLUCOSE BLD STRIP.AUTO-MCNC: 238 MG/DL (ref 65–100)

## 2018-11-18 PROCEDURE — 74011250636 HC RX REV CODE- 250/636

## 2018-11-18 PROCEDURE — 74011250636 HC RX REV CODE- 250/636: Performed by: SURGERY

## 2018-11-18 PROCEDURE — 65270000029 HC RM PRIVATE

## 2018-11-18 PROCEDURE — 94760 N-INVAS EAR/PLS OXIMETRY 1: CPT

## 2018-11-18 PROCEDURE — 76040000026: Performed by: INTERNAL MEDICINE

## 2018-11-18 PROCEDURE — 77010033678 HC OXYGEN DAILY

## 2018-11-18 PROCEDURE — 74018 RADEX ABDOMEN 1 VIEW: CPT

## 2018-11-18 PROCEDURE — 94640 AIRWAY INHALATION TREATMENT: CPT

## 2018-11-18 PROCEDURE — 0D9L8ZZ DRAINAGE OF TRANSVERSE COLON, VIA NATURAL OR ARTIFICIAL OPENING ENDOSCOPIC: ICD-10-PCS | Performed by: INTERNAL MEDICINE

## 2018-11-18 PROCEDURE — 74011250637 HC RX REV CODE- 250/637: Performed by: SURGERY

## 2018-11-18 PROCEDURE — 76060000032 HC ANESTHESIA 0.5 TO 1 HR: Performed by: INTERNAL MEDICINE

## 2018-11-18 PROCEDURE — 74011636637 HC RX REV CODE- 636/637: Performed by: SURGERY

## 2018-11-18 PROCEDURE — 82962 GLUCOSE BLOOD TEST: CPT

## 2018-11-18 PROCEDURE — 77030020263 HC SOL INJ SOD CL0.9% LFCR 1000ML

## 2018-11-18 PROCEDURE — 77030032490 HC SLV COMPR SCD KNE COVD -B: Performed by: INTERNAL MEDICINE

## 2018-11-18 PROCEDURE — 74011000250 HC RX REV CODE- 250

## 2018-11-18 PROCEDURE — C9113 INJ PANTOPRAZOLE SODIUM, VIA: HCPCS | Performed by: SURGERY

## 2018-11-18 PROCEDURE — 74011000250 HC RX REV CODE- 250: Performed by: SURGERY

## 2018-11-18 RX ORDER — ONDANSETRON 2 MG/ML
INJECTION INTRAMUSCULAR; INTRAVENOUS AS NEEDED
Status: DISCONTINUED | OUTPATIENT
Start: 2018-11-18 | End: 2018-11-18 | Stop reason: HOSPADM

## 2018-11-18 RX ORDER — SODIUM CHLORIDE, SODIUM LACTATE, POTASSIUM CHLORIDE, CALCIUM CHLORIDE 600; 310; 30; 20 MG/100ML; MG/100ML; MG/100ML; MG/100ML
100 INJECTION, SOLUTION INTRAVENOUS CONTINUOUS
Status: CANCELLED | OUTPATIENT
Start: 2018-11-18

## 2018-11-18 RX ORDER — HYDROMORPHONE HYDROCHLORIDE 2 MG/ML
0.5 INJECTION, SOLUTION INTRAMUSCULAR; INTRAVENOUS; SUBCUTANEOUS
Status: DISCONTINUED | OUTPATIENT
Start: 2018-11-18 | End: 2018-11-18 | Stop reason: HOSPADM

## 2018-11-18 RX ORDER — FLUMAZENIL 0.1 MG/ML
0.2 INJECTION INTRAVENOUS
Status: DISCONTINUED | OUTPATIENT
Start: 2018-11-18 | End: 2018-11-18 | Stop reason: HOSPADM

## 2018-11-18 RX ORDER — OXYCODONE HYDROCHLORIDE 5 MG/1
5 TABLET ORAL
Status: DISCONTINUED | OUTPATIENT
Start: 2018-11-18 | End: 2018-11-18 | Stop reason: HOSPADM

## 2018-11-18 RX ORDER — PROPOFOL 10 MG/ML
INJECTION, EMULSION INTRAVENOUS AS NEEDED
Status: DISCONTINUED | OUTPATIENT
Start: 2018-11-18 | End: 2018-11-18 | Stop reason: HOSPADM

## 2018-11-18 RX ORDER — SODIUM CHLORIDE 0.9 % (FLUSH) 0.9 %
5-10 SYRINGE (ML) INJECTION AS NEEDED
Status: DISCONTINUED | OUTPATIENT
Start: 2018-11-18 | End: 2018-11-18 | Stop reason: HOSPADM

## 2018-11-18 RX ORDER — LIDOCAINE HYDROCHLORIDE 20 MG/ML
INJECTION, SOLUTION EPIDURAL; INFILTRATION; INTRACAUDAL; PERINEURAL AS NEEDED
Status: DISCONTINUED | OUTPATIENT
Start: 2018-11-18 | End: 2018-11-18 | Stop reason: HOSPADM

## 2018-11-18 RX ORDER — DIPHENHYDRAMINE HYDROCHLORIDE 50 MG/ML
12.5 INJECTION, SOLUTION INTRAMUSCULAR; INTRAVENOUS
Status: DISCONTINUED | OUTPATIENT
Start: 2018-11-18 | End: 2018-11-18 | Stop reason: HOSPADM

## 2018-11-18 RX ORDER — DEXAMETHASONE SODIUM PHOSPHATE 4 MG/ML
INJECTION, SOLUTION INTRA-ARTICULAR; INTRALESIONAL; INTRAMUSCULAR; INTRAVENOUS; SOFT TISSUE AS NEEDED
Status: DISCONTINUED | OUTPATIENT
Start: 2018-11-18 | End: 2018-11-18 | Stop reason: HOSPADM

## 2018-11-18 RX ORDER — SODIUM CHLORIDE, SODIUM LACTATE, POTASSIUM CHLORIDE, CALCIUM CHLORIDE 600; 310; 30; 20 MG/100ML; MG/100ML; MG/100ML; MG/100ML
75 INJECTION, SOLUTION INTRAVENOUS CONTINUOUS
Status: DISCONTINUED | OUTPATIENT
Start: 2018-11-18 | End: 2018-11-18 | Stop reason: HOSPADM

## 2018-11-18 RX ORDER — FENTANYL CITRATE 50 UG/ML
INJECTION, SOLUTION INTRAMUSCULAR; INTRAVENOUS AS NEEDED
Status: DISCONTINUED | OUTPATIENT
Start: 2018-11-18 | End: 2018-11-18 | Stop reason: HOSPADM

## 2018-11-18 RX ORDER — SUCCINYLCHOLINE CHLORIDE 20 MG/ML
INJECTION INTRAMUSCULAR; INTRAVENOUS AS NEEDED
Status: DISCONTINUED | OUTPATIENT
Start: 2018-11-18 | End: 2018-11-18 | Stop reason: HOSPADM

## 2018-11-18 RX ORDER — NYSTATIN 100000 [USP'U]/G
POWDER TOPICAL 2 TIMES DAILY
Status: DISCONTINUED | OUTPATIENT
Start: 2018-11-18 | End: 2018-11-20 | Stop reason: HOSPADM

## 2018-11-18 RX ORDER — ROCURONIUM BROMIDE 10 MG/ML
INJECTION, SOLUTION INTRAVENOUS AS NEEDED
Status: DISCONTINUED | OUTPATIENT
Start: 2018-11-18 | End: 2018-11-18 | Stop reason: HOSPADM

## 2018-11-18 RX ORDER — NYSTATIN 100000 [USP'U]/G
POWDER TOPICAL 2 TIMES DAILY
Status: DISCONTINUED | OUTPATIENT
Start: 2018-11-18 | End: 2018-11-18

## 2018-11-18 RX ORDER — NALOXONE HYDROCHLORIDE 0.4 MG/ML
0.1 INJECTION, SOLUTION INTRAMUSCULAR; INTRAVENOUS; SUBCUTANEOUS
Status: DISCONTINUED | OUTPATIENT
Start: 2018-11-18 | End: 2018-11-18 | Stop reason: HOSPADM

## 2018-11-18 RX ADMIN — NYSTATIN: 100000 POWDER TOPICAL at 08:43

## 2018-11-18 RX ADMIN — DEXAMETHASONE SODIUM PHOSPHATE 4 MG: 4 INJECTION, SOLUTION INTRA-ARTICULAR; INTRALESIONAL; INTRAMUSCULAR; INTRAVENOUS; SOFT TISSUE at 11:36

## 2018-11-18 RX ADMIN — ALBUTEROL SULFATE 2.5 MG: 2.5 SOLUTION RESPIRATORY (INHALATION) at 07:12

## 2018-11-18 RX ADMIN — ALBUTEROL SULFATE 2.5 MG: 2.5 SOLUTION RESPIRATORY (INHALATION) at 14:05

## 2018-11-18 RX ADMIN — SODIUM CHLORIDE 100 ML/HR: 900 INJECTION, SOLUTION INTRAVENOUS at 15:06

## 2018-11-18 RX ADMIN — INSULIN LISPRO 4 UNITS: 100 INJECTION, SOLUTION INTRAVENOUS; SUBCUTANEOUS at 17:59

## 2018-11-18 RX ADMIN — LIDOCAINE HYDROCHLORIDE 60 MG: 20 INJECTION, SOLUTION EPIDURAL; INFILTRATION; INTRACAUDAL; PERINEURAL at 11:22

## 2018-11-18 RX ADMIN — ONDANSETRON 4 MG: 2 INJECTION INTRAMUSCULAR; INTRAVENOUS at 11:46

## 2018-11-18 RX ADMIN — TIOTROPIUM BROMIDE 18 MCG: 18 CAPSULE ORAL; RESPIRATORY (INHALATION) at 07:13

## 2018-11-18 RX ADMIN — FENTANYL CITRATE 100 MCG: 50 INJECTION, SOLUTION INTRAMUSCULAR; INTRAVENOUS at 11:22

## 2018-11-18 RX ADMIN — SODIUM CHLORIDE 100 ML/HR: 900 INJECTION, SOLUTION INTRAVENOUS at 04:22

## 2018-11-18 RX ADMIN — ONDANSETRON 4 MG: 2 INJECTION, SOLUTION INTRAMUSCULAR; INTRAVENOUS at 10:02

## 2018-11-18 RX ADMIN — ALBUTEROL SULFATE 2.5 MG: 2.5 SOLUTION RESPIRATORY (INHALATION) at 00:32

## 2018-11-18 RX ADMIN — SODIUM CHLORIDE 40 MG: 9 INJECTION, SOLUTION INTRAMUSCULAR; INTRAVENOUS; SUBCUTANEOUS at 08:42

## 2018-11-18 RX ADMIN — HYDROMORPHONE HYDROCHLORIDE 0.5 MG: 1 INJECTION, SOLUTION INTRAMUSCULAR; INTRAVENOUS; SUBCUTANEOUS at 08:43

## 2018-11-18 RX ADMIN — HYDROMORPHONE HYDROCHLORIDE 0.5 MG: 1 INJECTION, SOLUTION INTRAMUSCULAR; INTRAVENOUS; SUBCUTANEOUS at 22:24

## 2018-11-18 RX ADMIN — NYSTATIN: 100000 POWDER TOPICAL at 18:00

## 2018-11-18 RX ADMIN — ROCURONIUM BROMIDE 5 MG: 10 INJECTION, SOLUTION INTRAVENOUS at 11:22

## 2018-11-18 RX ADMIN — PROPOFOL 170 MG: 10 INJECTION, EMULSION INTRAVENOUS at 11:22

## 2018-11-18 RX ADMIN — NYSTATIN: 100000 POWDER TOPICAL at 04:22

## 2018-11-18 RX ADMIN — INSULIN LISPRO 4 UNITS: 100 INJECTION, SOLUTION INTRAVENOUS; SUBCUTANEOUS at 21:31

## 2018-11-18 RX ADMIN — SUCCINYLCHOLINE CHLORIDE 140 MG: 20 INJECTION INTRAMUSCULAR; INTRAVENOUS at 11:22

## 2018-11-18 RX ADMIN — ALBUTEROL SULFATE 2.5 MG: 2.5 SOLUTION RESPIRATORY (INHALATION) at 19:30

## 2018-11-18 NOTE — PROGRESS NOTES
Oxygen saturation on room air 76%. Applied oxygen at 2L/NC and oxygen saturation increased to 99%. Patient states she doesn't wear oxygen all the time at home but usually at night.

## 2018-11-18 NOTE — ANESTHESIA POSTPROCEDURE EVALUATION
Procedure(s): 
COLONOSCOPY 
COLON DECOMPRESSION. Anesthesia Post Evaluation Patient location during evaluation: PACU Patient participation: complete - patient participated Level of consciousness: awake Pain management: adequate Airway patency: patent Anesthetic complications: no 
Cardiovascular status: acceptable Respiratory status: spontaneous ventilation and acceptable Hydration status: acceptable Visit Vitals /74 Pulse 96 Temp 36.8 °C (98.3 °F) Resp 18 SpO2 100%

## 2018-11-18 NOTE — PROGRESS NOTES
TRANSFER - IN REPORT:    Verbal report received from Fela Valdes RN(name) on Dirk Early  being received from 2nd floor(unit) for ordered procedure      Report consisted of patients Situation, Background, Assessment and   Recommendations(SBAR). Information from the following report(s) SBAR, Kardex, Intake/Output, MAR and Recent Results was reviewed with the receiving nurse. Opportunity for questions and clarification was provided. Assessment completed upon patients arrival to unit and care assumed.

## 2018-11-18 NOTE — PROCEDURES
COLONOSCOPY    DATE of PROCEDURE: 11/18/2018    INDICATIONS:  1. Colon obstruction    MEDICATION:  MAC      INSTRUMENT: PCF    PROCEDURE: After obtaining informed consent, the patient was placed in the left lateral position and sedated. The scope was inserted to the transverse colon. The patient was unprepped. No stool initially in the rectum and sigmoid. Twist seen in the proximal sigmoid, likely a volvulus. The area was easily traversed, and proximally was a large amount of liquid and solid black stool and a dilated colon. Air was immediately suctioned out of the colon to reduce wall tension. The scope was advanced to the proximal transverse colon, after which I could not advance further due to prep quality. On reduction, there was a rush of air from the colon. On withdrawal, there was thick liquid stool in the sigmoid and rectum. Estimated blood loss: 0-minimal     ASSESSMENT/PLAN:  Symptoms and CT findings likely secondary to volvulus, but due to prep quality, she should undergo repeat colonoscopy in the future to rule out any mass.     VANESSA in the AM    Patricia Persaud MD  [facility] [place]

## 2018-11-18 NOTE — PROGRESS NOTES
Visit with patient to build rapport with . Patient is calm. Receptive to  presence. Encouraged and assured patient of our continued care.     Beau Baron,  Staff   C: [phone]  /  [email or web address]

## 2018-11-18 NOTE — PERIOP NOTES
TRANSFER - OUT REPORT:    Verbal report given to Rashaad BORJAS(name) on Atiya Holloway  being transferred to [facility](unit) for routine post - op       Report consisted of patients Situation, Background, Assessment and   Recommendations(SBAR). Information from the following report(s) SBAR, Kardex, OR Summary, Procedure Summary, Intake/Output and MAR was reviewed with the receiving nurse. Lines:   Peripheral IV 11/16/18 Anterior; Left Wrist (Active)   Site Assessment Clean, dry, & intact 11/18/2018 12:01 PM   Phlebitis Assessment 0 11/18/2018 12:01 PM   Infiltration Assessment 0 11/18/2018 12:01 PM   Dressing Status Clean, dry, & intact 11/18/2018 12:01 PM   Dressing Type Tape;Transparent 11/18/2018 12:01 PM   Hub Color/Line Status Pink; Infusing 11/18/2018 12:01 PM        Opportunity for questions and clarification was provided. Patient transported with:   O2 @ 2 liters    VTE prophylaxis orders have been written for Atiya Holloway. Patient and family given floor number and nurses name. Family updated re: pt status after security code verified.

## 2018-11-18 NOTE — ANESTHESIA PREPROCEDURE EVALUATION
Anesthetic History Review of Systems / Medical History Patient summary reviewed and pertinent labs reviewed Pulmonary COPD (Emphysema. Home O2 prn): severe Sleep apnea: No treatment Smoker (Former) Comments: Spirometry from 2017 shows FEV1 of 62% and FEV1/FVC of 66%. Neuro/Psych Cardiovascular Hypertension PAD and hyperlipidemia Exercise tolerance: <4 METS Comments: TTE 4/2/10:  Mild diastolic dysfunction w LVEF 55-65%. GI/Hepatic/Renal 
  
GERD: well controlled Renal disease: CRI Endo/Other Diabetes: well controlled, type 2 Hypothyroidism: well controlled Obesity, arthritis and anemia (Iron Deficiency) Other Findings Physical Exam 
 
Airway Mallampati: II 
TM Distance: 4 - 6 cm Neck ROM: decreased range of motion Mouth opening: Normal 
 
 Cardiovascular Regular rate and rhythm,  S1 and S2 normal,  no murmur, click, rub, or gallop Dental 
 
Dentition: Full lower dentures and Full upper dentures Pulmonary Wheezes:bilateral 
 
Prolonged expiration Abdominal 
GI exam deferred Other Findings Anesthetic Plan ASA: 3, emergent Anesthesia type: general 
 
 
 
 
Induction: Intravenous and RSI Anesthetic plan and risks discussed with: Patient Colonic obstruction. Plan for GETA for emergent decompression and stent placement per GI.

## 2018-11-18 NOTE — CONSULTS
[facility] Consult Note       Referring Physician:  Jose Manuel Gonzalez Date:  11/18/2018    Admit Date:  11/17/2018    Chief Complaint:  Colon obstruction    Subjective:     History of Present Illness:  Patient is a 70 y.o. female with EMMANUELLE, NIDDM, HTN who is seen in consultation at the request of Dr. Jael Au for colon obstruction. She has had nausea, abdominal pain, constipation all week. She believes it started after taking iron. She went to an urgent care with findings of possible colon obstruction on CT. Repeat CT with same result noted below. Received 2 enemas yesterday. Colonoscopy September 2017 with Dr. Nicolás Lewis with diverticulosis and ascending hyperplastic polyp. COMPARISON: CT enterography dated 1/18/2013     FINDINGS:      CT ABDOMEN: Cholecystectomy clips are present. The liver and spleen are normal.  The kidneys are symmetric in size and contrast enhancement. There is no  hydronephrosis. The adrenal glands and pancreas are normal. There is no ascites  or adenopathy. There is a large amount of stool throughout the cecum, ascending  colon and transverse colon extending to the mid descending colon. There is an  abrupt transition to a decompressed loop of colon in the left lower abdominal  quadrant; however no focal mass appreciated. There is diverticular change along  the distal descending and sigmoid colon. No acute diverticulitis evident.     CT PELVIS: The rectum is decompressed. No free fluid accumulating dependently in  the pelvis. The bladder is nondistended. No inguinal hernia or adenopathy.     No aggressive osseous lesions identified.     IMPRESSION  IMPRESSION:  1. Large amount of stool throughout the majority of the colon with a sharp  transition between stool-filled colon and decompressed colon in the descending  colon in the left lower abdominal quadrant. No discrete mass appreciated. There  is no adjacent adenopathy. 2. Diverticulosis of the sigmoid colon.  No acute diverticulitis. PMH:  Past Medical History:   Diagnosis Date    Anemia     iron deficiency. getting iron infusions--Dr. Christin Serrano Arrhythmia     pt states \" fast HR\"--but unsure if anything else--- followed by [facility]    Chronic pain     COPD (chronic obstructive pulmonary disease) ([address] \"years\"    centrilobular emphysema with air trapping. daily inhaler--- home O2 prn--- followed by dr Tj [facility]) 2007    type2-- sqbs avg am-- --- no hypo    GERD (gastroesophageal reflux disease) \"years\"    controlled with med    History of echocardiogram 02/02/2017    EF 82-98%, grade 1 diastolic dysfnction. no significant valve disease, normal RV size and function    History of nuclear stress test 08/22/2017    Findings consistent with no ischemia. Diaphragmatic attenuation is noted.  Hyperlipidemia \"years\"    Hypertension     controlled with med    Joint pain     back, right shoulder, knees    Osteoarthritis of right knee 11/30/2010    PAD (peripheral artery disease) ([facility])     Palpitations     Holter monitor 8/16/17--rhythm is sinus tachycardia. Avg. heart rate 106 bpm, minimum is 91 bpm.  Isolated APDs and couplets. Isolated nonsustained PAT and VPDs. No complex ventricular arrhythmia.     Pneumonia hospitalized May, June 2010    Renal insufficiency     [facility] (Dr. Ariel Velázquez)---- NOT SEEN IN 10 YRS -- PER PT    Sleep apnea     refuses to use CPAP    Thyroid nodule     followed by [facility]       PSH:  Past Surgical History:   Procedure Laterality Date    ABDOMEN SURGERY PROC UNLISTED  2010    CHOLECYSTECTOMY    HC BLD CARPEL TUNNEL RELEASE      Bilateral    HX BREAST AUGMENTATION  2005    reduction    HX GYN      Chronic UTIs, Has had multiple cystoscopes    HX HYSTERECTOMY      HX ORTHOPAEDIC  1991    lt. broken foot repair/metal plate-pins    HX ORTHOPAEDIC  2009    RIGHTKNEE REPLACEMENT    HX SALPINGO-OOPHORECTOMY  1990s    HX TOTAL ABDOMINAL HYSTERECTOMY  1970s    HX UROLOGICAL  1990s    bladder tacking    LAP,CHOLECYSTECTOMY  2001    REPAIR OF RECTOCELE  1990s       Allergies: Allergies   Allergen Reactions    Pcn [Penicillins] Anaphylaxis    Sulfa (Sulfonamide Antibiotics) Other (comments)     Gray-Bob syndrome    Advil [Ibuprofen] Nausea Only     Rash    Celebrex [Celecoxib] Shortness of Breath    Pneumovax 23 [Pneumococcal 23-Pallavi Ps Vaccine] Swelling       Home Medications:  Prior to Admission medications    Medication Sig Start Date End Date Taking? Authorizing Provider   dilTIAZem (CARDIZEM) 30 mg tablet Take 30 mg by mouth two (2) times a day. Provider, Historical   allopurinol (ZYLOPRIM) 100 mg tablet Take 100 mg by mouth every morning. Provider, Historical   fluticasone (FLONASE) 50 mcg/actuation nasal spray 2 Sprays by Both Nostrils route two (2) times a day. Provider, Historical   metFORMIN (GLUCOPHAGE) 500 mg tablet Take 500 mg by mouth two (2) times daily (with meals). Provider, Historical   Iron Fum & PS Cmp-FA-Vit C-B3 (INTEGRA F) 125-1-40-3 mg cap Take 1 Tab by mouth two (2) times a day. Provider, Historical   isosorbide dinitrate (ISORDIL) 30 mg tablet Take 20 mg by mouth every morning. Provider, Historical   montelukast (SINGULAIR) 5 mg chewable tablet Take 5 mg by mouth every morning. Pt states she takes a childrens dose of 5 mg daily    Provider, Historical   amLODIPine (NORVASC) 5 mg tablet Take 5 mg by mouth every morning. Provider, Historical   tiotropium (SPIRIVA WITH HANDIHALER) 18 mcg inhalation capsule Take 1 Cap by inhalation every morning. Provider, Historical   levalbuterol tartrate (XOPENEX HFA) 45 mcg/actuation inhaler Take 2 Puffs by inhalation two (2) times a day. Provider, Historical   albuterol (PROVENTIL HFA, VENTOLIN HFA, PROAIR HFA) 90 mcg/actuation inhaler Take 2 Puffs by inhalation every four (4) hours as needed for Wheezing.     Provider, Historical HYDROcodone-acetaminophen (NORCO)  mg tablet Take 1 Tab by mouth three (3) times daily. Provider, Historical   Omeprazole delayed release (PRILOSEC D/R) 20 mg tablet Take 40 mg by mouth every morning. Indications: gastroesophageal reflux disease    Provider, Historical   furosemide (LASIX) 40 mg tablet Take 20 mg by mouth every morning. 4/18/10   Provider, Historical   OTHER,NON-FORMULARY, Take 150 mg by mouth every twenty-eight (28) days. Xolair 150 mg, subcutaneous every 4 weeks  Indications: FOR COPD 4/17/10   Provider, Historical       Hospital Medications:  Current Facility-Administered Medications   Medication Dose Route Frequency    nystatin (MYCOSTATIN) 100,000 unit/gram powder   Topical BID    tiotropium (SPIRIVA) inhalation capsule 18 mcg  1 Cap Inhalation DAILY    pantoprazole (PROTONIX) 40 mg in sodium chloride 0.9% 10 mL injection  40 mg IntraVENous DAILY    HYDROmorphone (PF) (DILAUDID) injection 0.5 mg  0.5 mg IntraVENous Q4H PRN    ondansetron (ZOFRAN) injection 4 mg  4 mg IntraVENous Q4H PRN    0.9% sodium chloride infusion  100 mL/hr IntraVENous CONTINUOUS    albuterol (PROVENTIL VENTOLIN) nebulizer solution 2.5 mg  2.5 mg Nebulization TID    insulin lispro (HUMALOG) injection   SubCUTAneous AC&HS    morphine 10 mg/ml injection 5 mg  5 mg IntraVENous Q4H PRN       Social History:  Social History     Tobacco Use    Smoking status: Former Smoker     Packs/day: 0.50     Years: 30.00     Pack years: 15.00     Types: Cigarettes     Last attempt to quit: 1995     Years since quittin.8    Smokeless tobacco: Never Used   Substance Use Topics    Alcohol use: No     Pt denies any history of IV drug use, blood transfusions, or tattoos.     Family History:  Family History   Problem Relation Age of Onset    Heart Disease Father     Cancer Father     Malignant Hyperthermia Neg Hx     Pseudocholinesterase Deficiency Neg Hx     Delayed Awakening Neg Hx     Post-op Nausea/Vomiting Neg Hx     Emergence Delirium Neg Hx     Post-op Cognitive Dysfunction Neg Hx     Other Neg Hx        Review of Systems:  A detailed 10 system ROS is obtained, with pertinent positives as listed above. All others are negative. Diet:      Objective:     Physical Exam:  Vitals:  Visit Vitals  BP (!) 177/105 Comment: nurse notified   Pulse (!) 107 Comment: nurse notified   Temp 97.9 °F (36.6 °C)   Resp 18   SpO2 95%     Gen:  Pt is alert, cooperative, no acute distress  Skin:  Extremities and face reveal no rashes. No palmar erythema. No telangiectasias on the chest wall. HEENT: Sclerae anicteric. Extra-occular muscles are intact. No oral ulcers. No abnormal pigmentation of the lips. The neck is supple. Cardiovascular: Regular rate and rhythm. No murmurs, gallops, or rubs. Respiratory:  Comfortable breathing with no accessory muscle use. Clear breath sounds anteriorly with no wheezes, rales, or rhonchi. GI:  Abdomen nondistended, soft, and mildly tender without rebound. Decreased bowel sounds. Obese   Rectal:  Deferred  Musculoskeletal:  No pitting edema of the lower legs. Extremities have good range of motion. No costovertebral tenderness. Neurological:  Gross memory appears intact. Patient is alert and oriented. Psychiatric:  Mood appears appropriate with judgement intact. Lymphatic:  No cervical or supraclavicular adenopathy.     Laboratory:    Recent Labs     11/16/18  1714   WBC 11.9*   HGB 9.1*   HCT 30.8*      MCV 94.5      K 3.7      CO2 32   BUN 24*   CREA 1.24*   CA 8.7   *   AP 83   SGOT 14*   ALB 3.1*   TP 6.6          Assessment:       Active Problems:    Colon obstruction (HCC) (11/18/2018)      DDx mass, adhesion, volvulus  Plan:         2 enemas (had 2 yesterday)  Urgent colonoscopy, possible stent placement    Jackeline Mclain MD  [facility]

## 2018-11-19 ENCOUNTER — APPOINTMENT (OUTPATIENT)
Dept: GENERAL RADIOLOGY | Age: 68
DRG: 345 | End: 2018-11-19
Attending: NURSE PRACTITIONER
Payer: MEDICARE

## 2018-11-19 ENCOUNTER — ANESTHESIA EVENT (OUTPATIENT)
Dept: ENDOSCOPY | Age: 68
DRG: 345 | End: 2018-11-19
Payer: MEDICARE

## 2018-11-19 ENCOUNTER — ANESTHESIA (OUTPATIENT)
Dept: ENDOSCOPY | Age: 68
DRG: 345 | End: 2018-11-19
Payer: MEDICARE

## 2018-11-19 ENCOUNTER — APPOINTMENT (OUTPATIENT)
Dept: GENERAL RADIOLOGY | Age: 68
DRG: 345 | End: 2018-11-19
Attending: INTERNAL MEDICINE
Payer: MEDICARE

## 2018-11-19 LAB
GLUCOSE BLD STRIP.AUTO-MCNC: 154 MG/DL (ref 65–100)
GLUCOSE BLD STRIP.AUTO-MCNC: 168 MG/DL (ref 65–100)
GLUCOSE BLD STRIP.AUTO-MCNC: 177 MG/DL (ref 65–100)
GLUCOSE BLD STRIP.AUTO-MCNC: 260 MG/DL (ref 65–100)

## 2018-11-19 PROCEDURE — 82962 GLUCOSE BLOOD TEST: CPT

## 2018-11-19 PROCEDURE — 3E1H88Z IRRIGATION OF LOWER GI USING IRRIGATING SUBSTANCE, VIA NATURAL OR ARTIFICIAL OPENING ENDOSCOPIC: ICD-10-PCS | Performed by: INTERNAL MEDICINE

## 2018-11-19 PROCEDURE — 77030020263 HC SOL INJ SOD CL0.9% LFCR 1000ML

## 2018-11-19 PROCEDURE — 65270000029 HC RM PRIVATE

## 2018-11-19 PROCEDURE — 0D9E8ZZ DRAINAGE OF LARGE INTESTINE, VIA NATURAL OR ARTIFICIAL OPENING ENDOSCOPIC: ICD-10-PCS | Performed by: INTERNAL MEDICINE

## 2018-11-19 PROCEDURE — 74011250636 HC RX REV CODE- 250/636: Performed by: SURGERY

## 2018-11-19 PROCEDURE — 94760 N-INVAS EAR/PLS OXIMETRY 1: CPT

## 2018-11-19 PROCEDURE — 74011000250 HC RX REV CODE- 250

## 2018-11-19 PROCEDURE — 76060000033 HC ANESTHESIA 1 TO 1.5 HR: Performed by: INTERNAL MEDICINE

## 2018-11-19 PROCEDURE — 74011250637 HC RX REV CODE- 250/637: Performed by: INTERNAL MEDICINE

## 2018-11-19 PROCEDURE — 94640 AIRWAY INHALATION TREATMENT: CPT

## 2018-11-19 PROCEDURE — 77030039425 HC BLD LARYNG TRULITE DISP TELE -A: Performed by: ANESTHESIOLOGY

## 2018-11-19 PROCEDURE — 77030008703 HC TU ET UNCUF COVD -A: Performed by: ANESTHESIOLOGY

## 2018-11-19 PROCEDURE — 77030008477 HC STYL SATN SLP COVD -A: Performed by: ANESTHESIOLOGY

## 2018-11-19 PROCEDURE — 77010033678 HC OXYGEN DAILY

## 2018-11-19 PROCEDURE — 74011250636 HC RX REV CODE- 250/636

## 2018-11-19 PROCEDURE — C9113 INJ PANTOPRAZOLE SODIUM, VIA: HCPCS | Performed by: SURGERY

## 2018-11-19 PROCEDURE — 76040000026: Performed by: INTERNAL MEDICINE

## 2018-11-19 PROCEDURE — 74011250637 HC RX REV CODE- 250/637: Performed by: SURGERY

## 2018-11-19 PROCEDURE — 74011636637 HC RX REV CODE- 636/637: Performed by: SURGERY

## 2018-11-19 PROCEDURE — 74011000250 HC RX REV CODE- 250: Performed by: SURGERY

## 2018-11-19 RX ORDER — SODIUM CHLORIDE, SODIUM LACTATE, POTASSIUM CHLORIDE, CALCIUM CHLORIDE 600; 310; 30; 20 MG/100ML; MG/100ML; MG/100ML; MG/100ML
INJECTION, SOLUTION INTRAVENOUS
Status: DISCONTINUED | OUTPATIENT
Start: 2018-11-19 | End: 2018-11-19 | Stop reason: HOSPADM

## 2018-11-19 RX ORDER — ONDANSETRON 2 MG/ML
INJECTION INTRAMUSCULAR; INTRAVENOUS AS NEEDED
Status: DISCONTINUED | OUTPATIENT
Start: 2018-11-19 | End: 2018-11-19 | Stop reason: HOSPADM

## 2018-11-19 RX ORDER — SODIUM CHLORIDE 0.9 % (FLUSH) 0.9 %
5-10 SYRINGE (ML) INJECTION AS NEEDED
Status: CANCELLED | OUTPATIENT
Start: 2018-11-19

## 2018-11-19 RX ORDER — MIDAZOLAM HYDROCHLORIDE 1 MG/ML
2 INJECTION, SOLUTION INTRAMUSCULAR; INTRAVENOUS
Status: CANCELLED | OUTPATIENT
Start: 2018-11-19 | End: 2018-11-20

## 2018-11-19 RX ORDER — DEXAMETHASONE SODIUM PHOSPHATE 4 MG/ML
INJECTION, SOLUTION INTRA-ARTICULAR; INTRALESIONAL; INTRAMUSCULAR; INTRAVENOUS; SOFT TISSUE AS NEEDED
Status: DISCONTINUED | OUTPATIENT
Start: 2018-11-19 | End: 2018-11-19 | Stop reason: HOSPADM

## 2018-11-19 RX ORDER — OXYCODONE HYDROCHLORIDE 5 MG/1
5 TABLET ORAL
Status: CANCELLED | OUTPATIENT
Start: 2018-11-19 | End: 2018-11-20

## 2018-11-19 RX ORDER — PROPOFOL 10 MG/ML
INJECTION, EMULSION INTRAVENOUS AS NEEDED
Status: DISCONTINUED | OUTPATIENT
Start: 2018-11-19 | End: 2018-11-19 | Stop reason: HOSPADM

## 2018-11-19 RX ORDER — SUCCINYLCHOLINE CHLORIDE 20 MG/ML
INJECTION INTRAMUSCULAR; INTRAVENOUS AS NEEDED
Status: DISCONTINUED | OUTPATIENT
Start: 2018-11-19 | End: 2018-11-19 | Stop reason: HOSPADM

## 2018-11-19 RX ORDER — GABAPENTIN 300 MG/1
300 CAPSULE ORAL ONCE
Status: CANCELLED | OUTPATIENT
Start: 2018-11-19 | End: 2018-11-19

## 2018-11-19 RX ORDER — HYDROMORPHONE HYDROCHLORIDE 2 MG/ML
0.5 INJECTION, SOLUTION INTRAMUSCULAR; INTRAVENOUS; SUBCUTANEOUS
Status: CANCELLED | OUTPATIENT
Start: 2018-11-19

## 2018-11-19 RX ORDER — SODIUM CHLORIDE, SODIUM LACTATE, POTASSIUM CHLORIDE, CALCIUM CHLORIDE 600; 310; 30; 20 MG/100ML; MG/100ML; MG/100ML; MG/100ML
100 INJECTION, SOLUTION INTRAVENOUS CONTINUOUS
Status: CANCELLED | OUTPATIENT
Start: 2018-11-19

## 2018-11-19 RX ORDER — NALOXONE HYDROCHLORIDE 0.4 MG/ML
0.2 INJECTION, SOLUTION INTRAMUSCULAR; INTRAVENOUS; SUBCUTANEOUS AS NEEDED
Status: CANCELLED | OUTPATIENT
Start: 2018-11-19

## 2018-11-19 RX ORDER — LIDOCAINE HYDROCHLORIDE 10 MG/ML
0.1 INJECTION INFILTRATION; PERINEURAL AS NEEDED
Status: CANCELLED | OUTPATIENT
Start: 2018-11-19

## 2018-11-19 RX ORDER — FENTANYL CITRATE 50 UG/ML
INJECTION, SOLUTION INTRAMUSCULAR; INTRAVENOUS AS NEEDED
Status: DISCONTINUED | OUTPATIENT
Start: 2018-11-19 | End: 2018-11-19 | Stop reason: HOSPADM

## 2018-11-19 RX ORDER — ROCURONIUM BROMIDE 10 MG/ML
INJECTION, SOLUTION INTRAVENOUS AS NEEDED
Status: DISCONTINUED | OUTPATIENT
Start: 2018-11-19 | End: 2018-11-19 | Stop reason: HOSPADM

## 2018-11-19 RX ORDER — SODIUM CHLORIDE, SODIUM LACTATE, POTASSIUM CHLORIDE, CALCIUM CHLORIDE 600; 310; 30; 20 MG/100ML; MG/100ML; MG/100ML; MG/100ML
75 INJECTION, SOLUTION INTRAVENOUS CONTINUOUS
Status: CANCELLED | OUTPATIENT
Start: 2018-11-19

## 2018-11-19 RX ORDER — MIDAZOLAM HYDROCHLORIDE 1 MG/ML
2 INJECTION, SOLUTION INTRAMUSCULAR; INTRAVENOUS ONCE
Status: CANCELLED | OUTPATIENT
Start: 2018-11-19 | End: 2018-11-19

## 2018-11-19 RX ORDER — LIDOCAINE HYDROCHLORIDE 20 MG/ML
INJECTION, SOLUTION EPIDURAL; INFILTRATION; INTRACAUDAL; PERINEURAL AS NEEDED
Status: DISCONTINUED | OUTPATIENT
Start: 2018-11-19 | End: 2018-11-19 | Stop reason: HOSPADM

## 2018-11-19 RX ORDER — FENTANYL CITRATE 50 UG/ML
100 INJECTION, SOLUTION INTRAMUSCULAR; INTRAVENOUS ONCE
Status: CANCELLED | OUTPATIENT
Start: 2018-11-19 | End: 2018-11-19

## 2018-11-19 RX ADMIN — DEXAMETHASONE SODIUM PHOSPHATE 10 MG: 4 INJECTION, SOLUTION INTRA-ARTICULAR; INTRALESIONAL; INTRAMUSCULAR; INTRAVENOUS; SOFT TISSUE at 14:01

## 2018-11-19 RX ADMIN — SODIUM PHOSPHATE 1 ENEMA: 7; 19 ENEMA RECTAL at 14:17

## 2018-11-19 RX ADMIN — ALBUTEROL SULFATE 2.5 MG: 2.5 SOLUTION RESPIRATORY (INHALATION) at 07:17

## 2018-11-19 RX ADMIN — INSULIN LISPRO 2 UNITS: 100 INJECTION, SOLUTION INTRAVENOUS; SUBCUTANEOUS at 17:07

## 2018-11-19 RX ADMIN — INSULIN LISPRO 2 UNITS: 100 INJECTION, SOLUTION INTRAVENOUS; SUBCUTANEOUS at 07:41

## 2018-11-19 RX ADMIN — SODIUM CHLORIDE, SODIUM LACTATE, POTASSIUM CHLORIDE, CALCIUM CHLORIDE: 600; 310; 30; 20 INJECTION, SOLUTION INTRAVENOUS at 13:52

## 2018-11-19 RX ADMIN — NYSTATIN: 100000 POWDER TOPICAL at 07:35

## 2018-11-19 RX ADMIN — SODIUM CHLORIDE 100 ML/HR: 900 INJECTION, SOLUTION INTRAVENOUS at 04:19

## 2018-11-19 RX ADMIN — NYSTATIN: 100000 POWDER TOPICAL at 18:00

## 2018-11-19 RX ADMIN — FENTANYL CITRATE 50 MCG: 50 INJECTION, SOLUTION INTRAMUSCULAR; INTRAVENOUS at 13:55

## 2018-11-19 RX ADMIN — ROCURONIUM BROMIDE 5 MG: 10 INJECTION, SOLUTION INTRAVENOUS at 13:55

## 2018-11-19 RX ADMIN — ALBUTEROL SULFATE 2.5 MG: 2.5 SOLUTION RESPIRATORY (INHALATION) at 21:33

## 2018-11-19 RX ADMIN — INSULIN LISPRO 6 UNITS: 100 INJECTION, SOLUTION INTRAVENOUS; SUBCUTANEOUS at 21:23

## 2018-11-19 RX ADMIN — SODIUM CHLORIDE 40 MG: 9 INJECTION, SOLUTION INTRAMUSCULAR; INTRAVENOUS; SUBCUTANEOUS at 07:35

## 2018-11-19 RX ADMIN — ONDANSETRON 4 MG: 2 INJECTION INTRAMUSCULAR; INTRAVENOUS at 14:07

## 2018-11-19 RX ADMIN — SUCCINYLCHOLINE CHLORIDE 180 MG: 20 INJECTION INTRAMUSCULAR; INTRAVENOUS at 13:55

## 2018-11-19 RX ADMIN — TIOTROPIUM BROMIDE 18 MCG: 18 CAPSULE ORAL; RESPIRATORY (INHALATION) at 07:21

## 2018-11-19 RX ADMIN — FENTANYL CITRATE 50 MCG: 50 INJECTION, SOLUTION INTRAMUSCULAR; INTRAVENOUS at 14:07

## 2018-11-19 RX ADMIN — PROPOFOL 150 MG: 10 INJECTION, EMULSION INTRAVENOUS at 13:55

## 2018-11-19 RX ADMIN — LIDOCAINE HYDROCHLORIDE 80 MG: 20 INJECTION, SOLUTION EPIDURAL; INFILTRATION; INTRACAUDAL; PERINEURAL at 13:55

## 2018-11-19 RX ADMIN — HYDROMORPHONE HYDROCHLORIDE 0.5 MG: 1 INJECTION, SOLUTION INTRAMUSCULAR; INTRAVENOUS; SUBCUTANEOUS at 07:34

## 2018-11-19 NOTE — PROGRESS NOTES
Chronic respiratory failure in the setting of SBO being treated with 2LNC oxygen at home and while in the hospital and neb treatments. The medical record reflects the following:    Risk Factors: COPD, former smoker     Clinical Indicators: Increase need for Oxygen due to sats in the 70's on RA and 2LNC had to be applied.      Treatment: 2L oxygen via NC and nebs     Emely Ortiz NP

## 2018-11-19 NOTE — PROGRESS NOTES
TRANSFER - IN REPORT:    Verbal report received from  [place], [employer] on Viktor Hermosillo  being received from  GI lab for routine progression of care      Report consisted of patients Situation, Background, Assessment and   Recommendations(SBAR). Information from the following report(s) SBAR, Kardex, Procedure Summary, Intake/Output, MAR and Recent Results was reviewed with the receiving nurse. Opportunity for questions and clarification was provided. Assessment completed upon patients arrival to unit and care assumed.

## 2018-11-19 NOTE — PROGRESS NOTES
Problem: Gas Exchange - Impaired  Goal: *Absence of hypoxia  Patient progressing to being able to be weaned off supplemental O2.

## 2018-11-19 NOTE — ANESTHESIA POSTPROCEDURE EVALUATION
Procedure(s): 
COLON DECOMPRESSION 
SIGMOIDOSCOPY FLEXIBLE. Anesthesia Post Evaluation Patient location during evaluation: PACU Patient participation: complete - patient participated Level of consciousness: awake Pain management: satisfactory to patient Airway patency: patent Anesthetic complications: no 
Cardiovascular status: hemodynamically stable Respiratory status: spontaneous ventilation Hydration status: euvolemic Post anesthesia nausea and vomiting:  none Visit Vitals BP (!) 189/96 Pulse 89 Temp 36.5 °C (97.7 °F) Resp 15 SpO2 100%

## 2018-11-19 NOTE — PROGRESS NOTES
TRANSFER - IN REPORT:    Verbal report received from Rayshawn(name) on [address] [place]  being received from 202(unit) for routine progression of care      Report consisted of patients Situation, Background, Assessment and   Recommendations(SBAR). Information from the following report(s) SBAR and Kardex was reviewed with the receiving nurse. Opportunity for questions and clarification was provided. Assessment completed upon patients arrival to unit and care assumed.

## 2018-11-19 NOTE — ROUTINE PROCESS
TRANSFER - OUT REPORT:    Verbal report given to [facility] RN on Jolene Lee  being transferred to [facility] for routine progression of care       Report consisted of patients Situation, Background, Assessment and   Recommendations(SBAR). Information from the following report(s) Procedure Summary and MAR was reviewed with the receiving nurse. Lines:   Peripheral IV 11/19/18 Right Hand (Active)   Site Assessment Clean, dry, & intact 11/19/2018 11:00 AM   Phlebitis Assessment 0 11/19/2018 11:00 AM   Infiltration Assessment 0 11/19/2018 11:00 AM   Dressing Status Clean, dry, & intact 11/19/2018 11:00 AM   Dressing Type Tape;Transparent 11/19/2018 11:00 AM   Hub Color/Line Status Pink; Infusing 11/19/2018 11:00 AM        Opportunity for questions and clarification was provided.       Patient transported with:

## 2018-11-19 NOTE — PROGRESS NOTES
Aurelia Salinas  Admission Date: 11/17/2018   POD: * No surgery found *            Daily Progress Note: 11/19/2018  Subjective:   Miserable   Generalized abdominal discomfort, no point tenderness   Nausea but no vomiting  2 BMs last night after scope but abdominal pain persists  Denies any flatus  Objective:   AVSS  Physical Exam:          GEN: well developed and in no acute distress  HEENT:  PERRL, EOMI, no alar flaring or epistaxis, oral mucosa moist without cyanosis,   NECK:  no JVD, no retractions, no thyromegaly or masses  LUNGS:  Coarse wheezing bilaterally   HEART:  RRR  ABDOMEN:  soft, generalized tenderness. No rebound or guarding. No mass or hernia. Quiet bowel sounds present  EXTREMITIES:  warm with no cyanosis,  SKIN:  no jaundice or ecchymosis,   NEURO:  alert and oriented, grossly non-focal      LAB  Recent Labs     11/16/18  1714   WBC 11.9*   HGB 9.1*   HCT 30.8*         K 3.7      CO2 32   BUN 24*   CREA 1.24*   *   TBILI 0.2   SGOT 14*   ALT 15   AP 83     CT Results (most recent):  Results from Hospital Encounter encounter on 11/16/18   CT ABD PELV W CONT    Narrative CT OF THE ABDOMEN AND PELVIS WITH CONTRAST. CLINICAL INDICATION: Abdominal pain    PROCEDURE: Serial thin section axial images obtained from the lung bases through  the proximal femurs following the administration of 100 cc of Isovue 370  intravenous contrast.  Radiation dose reduction techniques were used for this  study. Our CT scanners use one or all of the following: Automated exposure  control, adjusted of the mA and/or kV according to patient size, iterative  reconstruction    COMPARISON: CT enterography dated 1/18/2013    FINDINGS:     CT ABDOMEN: Cholecystectomy clips are present. The liver and spleen are normal.  The kidneys are symmetric in size and contrast enhancement. There is no  hydronephrosis. The adrenal glands and pancreas are normal. There is no ascites  or adenopathy.  There is a large amount of stool throughout the cecum, ascending  colon and transverse colon extending to the mid descending colon. There is an  abrupt transition to a decompressed loop of colon in the left lower abdominal  quadrant; however no focal mass appreciated. There is diverticular change along  the distal descending and sigmoid colon. No acute diverticulitis evident. CT PELVIS: The rectum is decompressed. No free fluid accumulating dependently in  the pelvis. The bladder is nondistended. No inguinal hernia or adenopathy. No aggressive osseous lesions identified. Impression IMPRESSION:  1. Large amount of stool throughout the majority of the colon with a sharp  transition between stool-filled colon and decompressed colon in the descending  colon in the left lower abdominal quadrant. No discrete mass appreciated. There  is no adjacent adenopathy. 2. Diverticulosis of the sigmoid colon. No acute diverticulitis. XR Results (most recent):  Results from Hospital Encounter encounter on 11/17/18   XR ABD (KUB)    Narrative KUB    Clinical indications: Colonic distention    FINDINGS: Two supine views of the abdomen and pelvis limited by the patient's  body habitus and portable technique shows air-filled loops of colon throughout  the abdomen and pelvis the small amount of air noted distally. Cholecystectomy  clips are present. The lung bases are clear. Impression IMPRESSION: Nonspecific colonic distention noted on this limited exam.     Colonoscopy 11/18/18  PROCEDURE: After obtaining informed consent, the patient was placed in the left lateral position and sedated. The scope was inserted to the transverse colon. The patient was unprepped.     No stool initially in the rectum and sigmoid. Twist seen in the proximal sigmoid, likely a volvulus. The area was easily traversed, and proximally was a large amount of liquid and solid black stool and a dilated colon.  Air was immediately suctioned out of the colon to reduce wall tension.      The scope was advanced to the proximal transverse colon, after which I could not advance further due to prep quality. On reduction, there was a rush of air from the colon. On withdrawal, there was thick liquid stool in the sigmoid and rectum.     Estimated blood loss: 0-minimal      ASSESSMENT/PLAN:  Symptoms and CT findings likely secondary to volvulus, but due to prep quality, she should undergo repeat colonoscopy in the future to rule out any mass. Assessment:     Hospital Problems  Date Reviewed: 9/25/2017          Codes Class Noted POA    * (Principal) Colon obstruction [facility]) ICD-10-CM: J54.969  ICD-9-CM: 560.9  11/18/2018 Unknown            Plan:     Pt looks to have colon obstruction in mid descending colon.   She reports having had a colonoscopy in Feb of this year that was normal.    S/p colonoscopy with Dr. Emir Zhang with findings of volvulus   NPO  GI to re-evaluate this AM  Breathing tx PRN    Uli Hooks, CAROLEE

## 2018-11-19 NOTE — PROGRESS NOTES
Chart screened by  for discharge planning. No needs identified at this time. Please consult  if any new issues arise.     Care Management Interventions  Plan discussed with Pt/Family/Caregiver: No

## 2018-11-19 NOTE — PROCEDURES
COLONOSCOPY    DATE of PROCEDURE: 11/19/2018    INDICATION: abd pain, history of volvulus    POSTPROCEDURE DIAGNOSIS: possible pseudo obstruction    MEDICATION:   MAC anesthesia (see anesthesia report)    INSTRUMENT: [id number]    PROCEDURE: After obtaining informed consent, the patient was placed in the left lateral position and sedated. The endoscope was advanced to the cecum where the appendiceal orifice and ileocecal valve were identified. On withdrawal, the colon was carefully visualized in a 360 degree fashion, looking between folds and proximal and distal aspect of the folds. The patient was taken to the recovery area in stable condition. FINDINGS:  Rectum:  thick black liquid material but no obstruction or narrowing seen, no obvious dilation  Sigmoid:  thick black liquid material but no obstruction or narrowing seen, no obvious dilation  Descending Colon:  thick black liquid material but no obstruction or narrowing seen, no obvious dilation, one 1 cm fibrin based ulcer injected 1/2 of Fleet's enema solution  Transverse Colon: thick black liquid material but no obstruction or narrowing seen, no obvious dilation  Ascending Colon: thick black liquid material but no obstruction or narrowing seen, no obvious dilation; injected 1/2 of Fleet's enema solution  Cecum: thick black liquid material so I could no verify cecal location  Terminal ileum: not entered    Bowel Prep: poor but adequate enough to see no bird's beak and no obstruction  Estimated blood loss: 0-minimal   Specimens obtained during procedure: none    ASSESSMENT/PLAN: No volvulus seen and no significant colon dilation but I did suction air on withdrawal in case this is mild pseudo obstruction. Molasses enema today. AAS tomorrow.

## 2018-11-19 NOTE — ANESTHESIA PREPROCEDURE EVALUATION
Anesthetic History Review of Systems / Medical History Patient summary reviewed and pertinent labs reviewed Pulmonary COPD (Emphysema. Home O2 prn): severe Sleep apnea: No treatment Smoker (Former) Comments: Spirometry from 2017 shows FEV1 of 62% and FEV1/FVC of 66%. Neuro/Psych Cardiovascular Hypertension PAD and hyperlipidemia Exercise tolerance: <4 METS Comments: TTE 6/6/15:  Mild diastolic dysfunction w LVEF 55-65%. GI/Hepatic/Renal 
  
GERD: well controlled Renal disease: CRI Endo/Other Diabetes: well controlled, type 2 Hypothyroidism: well controlled Obesity, arthritis and anemia (Iron Deficiency) Other Findings Physical Exam 
 
Airway Mallampati: II 
TM Distance: 4 - 6 cm Neck ROM: decreased range of motion Mouth opening: Normal 
 
 Cardiovascular Regular rate and rhythm,  S1 and S2 normal,  no murmur, click, rub, or gallop Dental 
 
Dentition: Full lower dentures and Full upper dentures Pulmonary Wheezes:bilateral 
 
Prolonged expiration Abdominal 
GI exam deferred Other Findings Anesthetic Plan ASA: 3 Anesthesia type: general 
 
 
 
 
Induction: Intravenous and RSI Anesthetic plan and risks discussed with: Patient Colonic obstruction. Plan for GETA for decompression per GI. Mild scratches noted on posterior pharynx from ETT yesterday.

## 2018-11-19 NOTE — PHYSICIAN ADVISORY
Letter of Determination: Inpatient Status Appropriate    This patient was originally hospitalized as Inpatient Status on 11/16/2018 for evaluation of abdominal pain. This patient is appropriate for Inpatient Admission in accordance with CMS regulation Section 43 .3. Specifically, patient's stay is expected to be more than Two Midnights and was medically necessary. The patient's stay was medically necessary based on computed tomography scan demonstrating a sharp delineation between stool filled colon, and decompressed colon, with physician concern for acute colonic obstruction, and colonoscopy demonstrating colonic volvulus. Consistent with CMS guidelines, patient meets for inpatient status. It is our recommendation that this patient's hospitalization status should be INPATIENT status.      The final decision regarding the patient's hospitalization status depends on the attending physician's judgement.     Shameka Wild MD, BRUCE,   Physician [place].

## 2018-11-19 NOTE — PROGRESS NOTES
GI DAILY PROGRESS NOTE    Admit Date:  11/17/2018    Today's Date:  11/19/2018    CC:  Sigmoid volvulus    Subjective:     Patient: Had decompression on 11/18/18 with 2 BM immediately following, but now with increasing pain, no BM and nausea with increased distention. Had clear liquid diet this am.     KUB     Clinical indications: Colonic distention     FINDINGS: Two supine views of the abdomen and pelvis limited by the patient's  body habitus and portable technique shows air-filled loops of colon throughout  the abdomen and pelvis the small amount of air noted distally. Cholecystectomy  clips are present. The lung bases are clear.     IMPRESSION  IMPRESSION: Nonspecific colonic distention noted on this limited exam.   Imaging         Medications:   Current Facility-Administered Medications   Medication Dose Route Frequency    nystatin (MYCOSTATIN) 100,000 unit/gram powder   Topical BID    tiotropium (SPIRIVA) inhalation capsule 18 mcg  1 Cap Inhalation DAILY    pantoprazole (PROTONIX) 40 mg in sodium chloride 0.9% 10 mL injection  40 mg IntraVENous DAILY    HYDROmorphone (PF) (DILAUDID) injection 0.5 mg  0.5 mg IntraVENous Q4H PRN    ondansetron (ZOFRAN) injection 4 mg  4 mg IntraVENous Q4H PRN    0.9% sodium chloride infusion  100 mL/hr IntraVENous CONTINUOUS    albuterol (PROVENTIL VENTOLIN) nebulizer solution 2.5 mg  2.5 mg Nebulization TID    insulin lispro (HUMALOG) injection   SubCUTAneous AC&HS    morphine 10 mg/ml injection 5 mg  5 mg IntraVENous Q4H PRN       Review of Systems:  ROS was obtained, with pertinent positives as listed above. No chest pain or SOB. Diet:  Clear liquid diet    Objective:   Vitals:  Visit Vitals  /87   Pulse 96   Temp 98.3 °F (36.8 °C)   Resp 18   SpO2 96%     Intake/Output:  No intake/output data recorded.   11/17 1901 - 11/19 0700  In: 2206 [I.V.:2206]  Out: 2300 [Urine:1600]  Exam:  General appearance: alert, cooperative, no distress Family at bedside TEARFUL; APPEARS UNCOMFORTABLE  Lungs: clear to auscultation bilaterally anteriorly  Heart: regular rate and rhythm  Abdomen: soft, DISTENTION; BS IN THE RUQ ONLY AND WITH TINKLING. Bowel sounds normal. No masses, no organomegaly. GENERALIZED TENDERNESS  Extremities: extremities normal, atraumatic, no cyanosis or edema  Neuro:  alert and oriented X4    Data Review (Labs):    Recent Labs     11/16/18  1714   WBC 11.9*   HGB 9.1*   HCT 30.8*      MCV 94.5      K 3.7      CO2 32   BUN 24*   CREA 1.24*   CA 8.7   *   AP 83   SGOT 14*   ALT 15   TBILI 0.2   ALB 3.1*   TP 6.6       Assessment:     Principal Problem:    Colon obstruction (HCC) (11/18/2018)    70year old female with EMMANUELLE, NIDDM, HTN who we are following for sigmoid volvulus. Colonic decompression done by Dr. Christiano Doyle on 11/17/18. KUB 11/18/18 following decompression revealed Nonspecific colonic distention noted on this limited exam. She has increased pain, nausea and distention. She has had no BM since immediately following colonic decompression. Plan:     1. NPO until procedure. 2.Repeat flexible sigmoidoscopy with colonic decompression and rectal tube placement by Dr. Gracia Don today. Alcira Mckeon. Nikole Romero in collaboration with Dr. Gracia Don.    [facility]

## 2018-11-19 NOTE — PROGRESS NOTES
Problem: Falls - Risk of  Goal: *Absence of Falls  Document Jeremi Fall Risk and appropriate interventions in the flowsheet.   Outcome: Progressing Towards Goal  Fall Risk Interventions:            Medication Interventions: Patient to call before getting OOB

## 2018-11-19 NOTE — PROGRESS NOTES
TRANSFER - OUT REPORT:    Verbal report given to  Magdalene Galindo RN on Bassam Mu  being transferred to  GI lab for ordered procedure       Report consisted of patients Situation, Background, Assessment and   Recommendations(SBAR). Information from the following report(s) SBAR, ED Summary, Intake/Output, MAR and Recent Results was reviewed with the receiving nurse. Lines:   Peripheral IV 11/16/18 Anterior; Left Wrist (Active)   Site Assessment Clean, dry, & intact 11/19/2018  7:53 AM   Phlebitis Assessment 0 11/19/2018  7:53 AM   Infiltration Assessment 0 11/19/2018  7:53 AM   Dressing Status Clean, dry, & intact 11/19/2018  7:53 AM   Dressing Type Transparent 11/19/2018  7:53 AM   Hub Color/Line Status Infusing 11/19/2018  7:53 AM        Opportunity for questions and clarification was provided.

## 2018-11-19 NOTE — CDMP QUERY
Please clarify if this patient is being treated/managed for: 
 
Chronic respiratory failure in the setting of SBO being treated with 2LNC oxygen at home and while in the hospital and neb treatments. =>Other Explanation of clinical findings =>Unable to Determine (no explanation of clinical findings) The medical record reflects the following: 
 
Risk Factors: COPD, former smoker Clinical Indicators: Increase need for Oxygen due to sats in the 70's on RA and 2LNC had to be applied. Treatment: 2L oxygen via NC and nebs Please clarify and document your clinical opinion in the progress notes and discharge summary including the definitive and/or presumptive diagnosis, (suspected or probable), related to the above clinical findings. Please include clinical findings supporting your diagnosis. Thanks, 
Riya Ambriz RN, [employer] Compliant Documentation Management Program 
[phone]

## 2018-11-20 ENCOUNTER — APPOINTMENT (OUTPATIENT)
Dept: GENERAL RADIOLOGY | Age: 68
DRG: 345 | End: 2018-11-20
Attending: INTERNAL MEDICINE
Payer: MEDICARE

## 2018-11-20 VITALS
SYSTOLIC BLOOD PRESSURE: 165 MMHG | DIASTOLIC BLOOD PRESSURE: 79 MMHG | OXYGEN SATURATION: 99 % | RESPIRATION RATE: 16 BRPM | HEART RATE: 102 BPM | TEMPERATURE: 98.6 F

## 2018-11-20 LAB
GLUCOSE BLD STRIP.AUTO-MCNC: 149 MG/DL (ref 65–100)
GLUCOSE BLD STRIP.AUTO-MCNC: 216 MG/DL (ref 65–100)

## 2018-11-20 PROCEDURE — 82962 GLUCOSE BLOOD TEST: CPT

## 2018-11-20 PROCEDURE — 74011250636 HC RX REV CODE- 250/636: Performed by: SURGERY

## 2018-11-20 PROCEDURE — 74011000250 HC RX REV CODE- 250: Performed by: SURGERY

## 2018-11-20 PROCEDURE — 77030020263 HC SOL INJ SOD CL0.9% LFCR 1000ML

## 2018-11-20 PROCEDURE — 77010033678 HC OXYGEN DAILY

## 2018-11-20 PROCEDURE — 94640 AIRWAY INHALATION TREATMENT: CPT

## 2018-11-20 PROCEDURE — 94760 N-INVAS EAR/PLS OXIMETRY 1: CPT

## 2018-11-20 PROCEDURE — C9113 INJ PANTOPRAZOLE SODIUM, VIA: HCPCS | Performed by: SURGERY

## 2018-11-20 PROCEDURE — 74011636637 HC RX REV CODE- 636/637: Performed by: SURGERY

## 2018-11-20 PROCEDURE — 74022 RADEX COMPL AQT ABD SERIES: CPT

## 2018-11-20 RX ORDER — NYSTATIN 100000 [USP'U]/G
POWDER TOPICAL 2 TIMES DAILY
Qty: 1 BOTTLE | Refills: 0 | Status: SHIPPED | OUTPATIENT
Start: 2018-11-20

## 2018-11-20 RX ADMIN — ALBUTEROL SULFATE 2.5 MG: 2.5 SOLUTION RESPIRATORY (INHALATION) at 08:20

## 2018-11-20 RX ADMIN — NYSTATIN: 100000 POWDER TOPICAL at 07:58

## 2018-11-20 RX ADMIN — SODIUM CHLORIDE 100 ML/HR: 900 INJECTION, SOLUTION INTRAVENOUS at 05:55

## 2018-11-20 RX ADMIN — INSULIN LISPRO 4 UNITS: 100 INJECTION, SOLUTION INTRAVENOUS; SUBCUTANEOUS at 07:57

## 2018-11-20 RX ADMIN — HYDROMORPHONE HYDROCHLORIDE 0.5 MG: 1 INJECTION, SOLUTION INTRAMUSCULAR; INTRAVENOUS; SUBCUTANEOUS at 02:18

## 2018-11-20 RX ADMIN — SODIUM CHLORIDE 40 MG: 9 INJECTION, SOLUTION INTRAMUSCULAR; INTRAVENOUS; SUBCUTANEOUS at 07:59

## 2018-11-20 NOTE — PROGRESS NOTES
Bassam   Admission Date: 11/17/2018   POD: * No surgery found *            Daily Progress Note: 11/20/2018  Subjective:   \"I feel 100% better\"  +multiple BMs/+flatus  Objective:   AVSS  Physical Exam:          GEN: well developed and in no acute distress  HEENT:  PERRL, EOMI, no alar flaring or epistaxis, oral mucosa moist without cyanosis,   NECK:  no JVD, no retractions, no thyromegaly or masses  LUNGS:  Coarse wheezing bilaterally   HEART:  RRR  ABDOMEN:  soft, non-distended, non -tender. No rebound or guarding. No mass or hernia. +BSs  EXTREMITIES:  warm with no cyanosis,  SKIN:  no jaundice or ecchymosis,   NEURO:  alert and oriented, grossly non-focal      LAB  No results for input(s): WBC, HGB, HCT, PLT, NA, K, CL, CO2, BUN, CREA, MG, PHOS, GLU, PTP, INR, APTT, TBIL, TBILI, SGOT, GPT, ALT, AP, AML, LPSE, HGBEXT, HCTEXT, PLTEXT, HGBEXT, HCTEXT, PLTEXT in the last 72 hours. No lab exists for component: INREXT, INREXT  CT Results (most recent):  Results from Hospital Encounter encounter on 11/16/18   CT ABD PELV W CONT    Narrative CT OF THE ABDOMEN AND PELVIS WITH CONTRAST. CLINICAL INDICATION: Abdominal pain    PROCEDURE: Serial thin section axial images obtained from the lung bases through  the proximal femurs following the administration of 100 cc of Isovue 370  intravenous contrast.  Radiation dose reduction techniques were used for this  study. Our CT scanners use one or all of the following: Automated exposure  control, adjusted of the mA and/or kV according to patient size, iterative  reconstruction    COMPARISON: CT enterography dated 1/18/2013    FINDINGS:     CT ABDOMEN: Cholecystectomy clips are present. The liver and spleen are normal.  The kidneys are symmetric in size and contrast enhancement. There is no  hydronephrosis. The adrenal glands and pancreas are normal. There is no ascites  or adenopathy.  There is a large amount of stool throughout the cecum, ascending  colon and transverse colon extending to the mid descending colon. There is an  abrupt transition to a decompressed loop of colon in the left lower abdominal  quadrant; however no focal mass appreciated. There is diverticular change along  the distal descending and sigmoid colon. No acute diverticulitis evident. CT PELVIS: The rectum is decompressed. No free fluid accumulating dependently in  the pelvis. The bladder is nondistended. No inguinal hernia or adenopathy. No aggressive osseous lesions identified. Impression IMPRESSION:  1. Large amount of stool throughout the majority of the colon with a sharp  transition between stool-filled colon and decompressed colon in the descending  colon in the left lower abdominal quadrant. No discrete mass appreciated. There  is no adjacent adenopathy. 2. Diverticulosis of the sigmoid colon. No acute diverticulitis. XR Results (most recent):  Results from Hospital Encounter encounter on 11/17/18   XR ABD (KUB)    Narrative KUB    Clinical indications: Colonic distention    FINDINGS: Two supine views of the abdomen and pelvis limited by the patient's  body habitus and portable technique shows air-filled loops of colon throughout  the abdomen and pelvis the small amount of air noted distally. Cholecystectomy  clips are present. The lung bases are clear. Impression IMPRESSION: Nonspecific colonic distention noted on this limited exam.     Colonoscopy 11/18/18  PROCEDURE: After obtaining informed consent, the patient was placed in the left lateral position and sedated. The scope was inserted to the transverse colon. The patient was unprepped.     No stool initially in the rectum and sigmoid. Twist seen in the proximal sigmoid, likely a volvulus. The area was easily traversed, and proximally was a large amount of liquid and solid black stool and a dilated colon.  Air was immediately suctioned out of the colon to reduce wall tension.      The scope was advanced to the proximal transverse colon, after which I could not advance further due to prep quality. On reduction, there was a rush of air from the colon. On withdrawal, there was thick liquid stool in the sigmoid and rectum.     Estimated blood loss: 0-minimal      ASSESSMENT/PLAN:  Symptoms and CT findings likely secondary to volvulus, but due to prep quality, she should undergo repeat colonoscopy in the future to rule out any mass. Colonoscopy 11/19/18  FINDINGS:  Rectum:  thick black liquid material but no obstruction or narrowing seen, no obvious dilation  Sigmoid:  thick black liquid material but no obstruction or narrowing seen, no obvious dilation  Descending Colon:  thick black liquid material but no obstruction or narrowing seen, no obvious dilation, one 1 cm fibrin based ulcer injected 1/2 of Fleet's enema solution  Transverse Colon: thick black liquid material but no obstruction or narrowing seen, no obvious dilation  Ascending Colon: thick black liquid material but no obstruction or narrowing seen, no obvious dilation; injected 1/2 of Fleet's enema solution  Cecum: thick black liquid material so I could no verify cecal location  Terminal ileum: not entered     Bowel Prep: poor but adequate enough to see no bird's beak and no obstruction  Estimated blood loss: 0-minimal   Specimens obtained during procedure: none     ASSESSMENT/PLAN: No volvulus seen and no significant colon dilation but I did suction air on withdrawal in case this is mild pseudo obstruction. Molasses enema today. AAS tomorrow.       Assessment:     Hospital Problems  Date Reviewed: 9/25/2017          Codes Class Noted POA    * (Principal) Colon obstruction [facility]) ICD-10-CM: H18.299  ICD-9-CM: 560.9  11/18/2018 Unknown            Plan:     No evidence of obstruction on scope  Multiple BMs overnight  Pain has resolved  AAS pending this AM  Start GI soft diet after Xray  Possibly home this afternoon or in the morning    Neeta Roth NP

## 2018-11-20 NOTE — PROGRESS NOTES
GI DAILY PROGRESS NOTE    Admit Date:  11/17/2018    Today's Date:  11/20/2018    CC:  Sigmoid volvulus    Subjective: Had decompression on 11/18/18 with 2 BM immediately following, but 11/19 with increasing pain, no BM and nausea with increased distention. Had f/u decompression 11/19- then No volvulus seen and no significant colon dilation but Dr. Alesha Laird did suction air on withdrawal in case this is mild pseudo obstruction. Chart notes report that after milk of molasses enema pt with multiple BMs overnight. KUB this AM with nonspecific bowel gas pattern. She confirms this- she is much improved- reports multiple BMs since milk of molasses enema, at least 10. No BRBPR. No abdominal pain, N/V. Abdominal distention improved. She is tolerating GI soft diet. Per surgery- possibly home this afternoon or in the morning    Medications:   Current Facility-Administered Medications   Medication Dose Route Frequency    nystatin (MYCOSTATIN) 100,000 unit/gram powder   Topical BID    tiotropium (SPIRIVA) inhalation capsule 18 mcg  1 Cap Inhalation DAILY    pantoprazole (PROTONIX) 40 mg in sodium chloride 0.9% 10 mL injection  40 mg IntraVENous DAILY    HYDROmorphone (PF) (DILAUDID) injection 0.5 mg  0.5 mg IntraVENous Q4H PRN    ondansetron (ZOFRAN) injection 4 mg  4 mg IntraVENous Q4H PRN    0.9% sodium chloride infusion  100 mL/hr IntraVENous CONTINUOUS    albuterol (PROVENTIL VENTOLIN) nebulizer solution 2.5 mg  2.5 mg Nebulization TID    insulin lispro (HUMALOG) injection   SubCUTAneous AC&HS    morphine 10 mg/ml injection 5 mg  5 mg IntraVENous Q4H PRN       Review of Systems:  ROS was obtained, with pertinent positives as listed above. No chest pain or SOB.     Diet:  GI Soft diet    Objective:   Vitals:  Visit Vitals  BP (!) 189/95   Pulse 88   Temp 98.3 °F (36.8 °C)   Resp 18   SpO2 98%     Intake/Output:  11/20 0701 - 11/20 1900  In: 395 [I.V.:395]  Out: -   11/18 1901 - 11/20 0700  In: 4502 [I.V.:1578]  Out: 300 [Urine:300]  Exam:  General appearance: alert, cooperative, no distress Family at bedside TEARFUL; APPEARS UNCOMFORTABLE  Lungs: clear to auscultation bilaterally anteriorly  Heart: regular rate and rhythm  Abdomen: soft, non-distended. Non-tender. Bowel sounds normal. No masses, no organomegaly. Neuro:  alert and oriented X4    Data Review (Labs):    No results for input(s): WBC, HGB, HCT, PLT, MCV, NA, K, CL, CO2, BUN, CREA, CA, MG, GLU, AP, SGOT, GPT, ALT, TBIL, TBILI, CBIL, ALB, TP, AML, LPSE, PTP, INR, APTT, HGBEXT, HCTEXT, PLTEXT, HGBEXT, HCTEXT, PLTEXT in the last 72 hours. No lab exists for component: DBIL, INREXT, INREXT    KU 11/20/18  History: Abdominal pain. Recent ERCP/PTC. Comparison: KU 11/18/2018  Findings:   A frontal view of the chest has been included as part of this study. The  cardiac silhouette is mildly enlarged although not felt to be significantly  changed from the prior study. The lungs are expanded without evidence for  pneumothorax. No consolidation, or evidence of pleural effusion is seen. No  free air is seen in the non-dependent abdomen.     The visualized bowel gas pattern is non-specific. No dilated loops of small  bowel are seen. On the upright view, no  free intraperitoneal air is seen. No  significant small bowel air fluid levels are seen. Few air-fluid levels are seen  within nondilated colon and the left abdomen of uncertain significance. Right  upper quadrant surgical clips are seen suggesting prior cholecystectomy. IMPRESSION:   1. Nonspecific bowel gas pattern. 2.  No free intraperitoneal air. KUB 11/18/18  Clinical indications: Colonic distention  FINDINGS: Two supine views of the abdomen and pelvis limited by the patient's  body habitus and portable technique shows air-filled loops of colon throughout  the abdomen and pelvis the small amount of air noted distally. Cholecystectomy  clips are present.  The lung bases are clear.  IMPRESSION: Nonspecific colonic distention noted on this limited exam     COLONOSCOPY 11/19/18  INDICATION: abd pain, history of volvulus  POSTPROCEDURE DIAGNOSIS: possible pseudo obstruction  MEDICATION:   MAC anesthesia (see anesthesia report)  INSTRUMENT: [id number]  PROCEDURE: After obtaining informed consent, the patient was placed in the left lateral position and sedated. The endoscope was advanced to the cecum where the appendiceal orifice and ileocecal valve were identified. On withdrawal, the colon was carefully visualized in a 360 degree fashion, looking between folds and proximal and distal aspect of the folds. The patient was taken to the recovery area in stable condition. FINDINGS:  Rectum:  thick black liquid material but no obstruction or narrowing seen, no obvious dilation  Sigmoid:  thick black liquid material but no obstruction or narrowing seen, no obvious dilation  Descending Colon:  thick black liquid material but no obstruction or narrowing seen, no obvious dilation, one 1 cm fibrin based ulcer injected 1/2 of Fleet's enema solution  Transverse Colon: thick black liquid material but no obstruction or narrowing seen, no obvious dilation  Ascending Colon: thick black liquid material but no obstruction or narrowing seen, no obvious dilation; injected 1/2 of Fleet's enema solution  Cecum: thick black liquid material so I could no verify cecal location  Terminal ileum: not entered  Bowel Prep: poor but adequate enough to see no bird's beak and no obstruction  Estimated blood loss: 0-minimal   Specimens obtained during procedure: none  ASSESSMENT/PLAN: No volvulus seen and no significant colon dilation but I did suction air on withdrawal in case this is mild pseudo obstruction. Molasses enema today. AAS tomorrow. COLONOSCOPY 11/18/18  INDICATIONS:  1.  Colon obstruction  MEDICATION:  MAC    INSTRUMENT: PCF  PROCEDURE: After obtaining informed consent, the patient was placed in the left lateral position and sedated. The scope was inserted to the transverse colon. The patient was unprepped. No stool initially in the rectum and sigmoid. Twist seen in the proximal sigmoid, likely a volvulus. The area was easily traversed, and proximally was a large amount of liquid and solid black stool and a dilated colon. Air was immediately suctioned out of the colon to reduce wall tension. The scope was advanced to the proximal transverse colon, after which I could not advance further due to prep quality. On reduction, there was a rush of air from the colon. On withdrawal, there was thick liquid stool in the sigmoid and rectum. Estimated blood loss: 0-minimal   ASSESSMENT/PLAN:  Symptoms and CT findings likely secondary to volvulus, but due to prep quality, she should undergo repeat colonoscopy in the future to rule out any mass.     Assessment:     Principal Problem:    Colon obstruction ([address] (11/18/2018)    70year old female with EMMANUELLE, NIDDM, HTN who we are following for sigmoid volvulus. Colonic decompression done by Dr. Akil Valdez on 11/17/18 and 11/18/18 by Dr. Ray Treadwell- see above. Now significantly improved. Plan:     1. Agree with GI soft diet  2. Nothing further from GI standpoint. Hopefully home in near future.   Jerry Gray PA-C  [facility]

## 2018-11-20 NOTE — DISCHARGE SUMMARY
[address], [address] [facility]  [phone]   Discharge Summary     Remberto Oconnor  MRN: [id number]     : 1950     Age: 70 y.o. Admit date: 2018     Discharge date: 2018  Attending Physician: Dr. Ellie Nation  Primary Discharge Diagnosis:   Principal Problem:    Colon obstruction ([address] (2018)      Primary Operations or Procedures Performed :  Procedure(s):  COLON DECOMPRESSION  SIGMOIDOSCOPY FLEXIBLE     Brief History and Reason for Admission: Remberto Oconnor was admitted with the following history of present illness. This patient is a 70 y.o. seen and evaluated at the request of  At an outside urgent care. She complains of generalized abdominal pain for a week and 2 days of obstipation. CT scan done 2 days ago reportedly only showed a large amount of stool in the colon. Pt has history of hysterectomy and bladder tack, cholecystectomy. No history of prior bowel obstructions. Has some nausea but has not vomited. Hospital Course:  Pt was admitted for colon obstruction. She underwent colonoscopy on 18 and at that time was suspected to have a sigmoid volvulus. She had several BMs after the scope but continued to have abdominal pain which required second colonoscopy on 18 with findings below:  No volvulus seen and no significant colon dilation but I did suction air on withdrawal in case this is mild pseudo obstruction. Molasses enema today. AAS tomorrow. Pt had multiple BMs after second colonoscopy and pseudo obstruction appeared to have resolved. She was tolerating diet without N/V or abdominal pain. She had 10+ BMs with much improvement. Condition at Discharge: Good    Discharge Medications:   Current Discharge Medication List      START taking these medications    Details   nystatin (MYCOSTATIN) powder Apply  to affected area two (2) times a day.   Qty: 1 Bottle, Refills: 0         CONTINUE these medications which have NOT CHANGED    Details   dilTIAZem (CARDIZEM) 30 mg tablet Take 30 mg by mouth two (2) times a day. allopurinol (ZYLOPRIM) 100 mg tablet Take 100 mg by mouth every morning. fluticasone (FLONASE) 50 mcg/actuation nasal spray 2 Sprays by Both Nostrils route two (2) times a day. metFORMIN (GLUCOPHAGE) 500 mg tablet Take 500 mg by mouth two (2) times daily (with meals). Iron Fum & PS Cmp-FA-Vit C-B3 (INTEGRA F) 125-1-40-3 mg cap Take 1 Tab by mouth two (2) times a day. isosorbide dinitrate (ISORDIL) 30 mg tablet Take 20 mg by mouth every morning.      montelukast (SINGULAIR) 5 mg chewable tablet Take 5 mg by mouth every morning. Pt states she takes a childrens dose of 5 mg daily      amLODIPine (NORVASC) 5 mg tablet Take 5 mg by mouth every morning. tiotropium (SPIRIVA WITH HANDIHALER) 18 mcg inhalation capsule Take 1 Cap by inhalation every morning. levalbuterol tartrate (XOPENEX HFA) 45 mcg/actuation inhaler Take 2 Puffs by inhalation two (2) times a day. albuterol (PROVENTIL HFA, VENTOLIN HFA, PROAIR HFA) 90 mcg/actuation inhaler Take 2 Puffs by inhalation every four (4) hours as needed for Wheezing. HYDROcodone-acetaminophen (NORCO)  mg tablet Take 1 Tab by mouth three (3) times daily. Omeprazole delayed release (PRILOSEC D/R) 20 mg tablet Take 40 mg by mouth every morning. Indications: gastroesophageal reflux disease      furosemide (LASIX) 40 mg tablet Take 20 mg by mouth every morning. OTHER,NON-FORMULARY, Take 150 mg by mouth every twenty-eight (28) days. Xolair 150 mg, subcutaneous every 4 weeks  Indications: FOR COPD               Disposition: good    Discharge Instructions/Follow-up Care:      Discharge Instructions/Follow-up Plans:   MD Instructions:     Follow-up with Gastroenterology in 1-2 weeks.     Diet - as tolerated - Soft foods diet.  If you develop abdominal pain, nausea, or vomiting, stop eating and sip on clear liquids only until it passes. Activity - ambulate, activity as tolerated.     Resume other home medications.      If problems or questions arise, please call our office at [phone].     Greater than 30 minutes were spent discharging the patient    Signed:  Raciel Jane   11/20/2018  1:47 PM

## 2018-11-20 NOTE — DISCHARGE INSTRUCTIONS
Colonoscopy: What to Expect at [address] [place] [address]  After you have a colonoscopy, you will stay at the clinic for 1 to 2 hours until the medicines wear off. Then you can go home. But you will need to arrange for a ride. Your doctor will tell you when you can eat and do your other usual activities. Your doctor will talk to you about when you will need your next colonoscopy. Your doctor can help you decide how often you need to be checked. This will depend on the results of your test and your risk for colorectal cancer. After the test, you may be bloated or have gas pains. You may need to pass gas. If a biopsy was done or a polyp was removed, you may have streaks of blood in your stool (feces) for a few days. Problems such as heavy rectal bleeding may not occur until several weeks after the test. This isn't common. But it can happen after polyps are removed. This care sheet gives you a general idea about how long it will take for you to recover. But each person recovers at a different pace. Follow the steps below to get better as quickly as possible. How can you care for yourself at home? Activity    · Rest when you feel tired.     · You can do your normal activities when it feels okay to do so. Diet    · Follow your doctor's directions for eating.     · Unless your doctor has told you not to, drink plenty of fluids. This helps to replace the fluids that were lost during the colon prep.     · Do not drink alcohol. Medicines    · Your doctor will tell you if and when you can restart your medicines. He or she will also give you instructions about taking any new medicines.     · If you take blood thinners, such as warfarin (Coumadin), clopidogrel (Plavix), or aspirin, be sure to talk to your doctor. He or she will tell you if and when to start taking those medicines again.  Make sure that you understand exactly what your doctor wants you to do.     · If polyps were removed or a biopsy was done during the test, your doctor may tell you not to take aspirin or other anti-inflammatory medicines for a few days. These include ibuprofen (Advil, Motrin) and naproxen (Aleve). Other instructions    · For your safety, do not drive or operate machinery until the medicine wears off and you can think clearly. Your doctor may tell you not to drive or operate machinery until the day after your test.     · Do not sign legal documents or make major decisions until the medicine wears off and you can think clearly. The anesthesia can make it hard for you to fully understand what you are agreeing to. Follow-up care is a key part of your treatment and safety. Be sure to make and go to all appointments, and call your doctor if you are having problems. It's also a good idea to know your test results and keep a list of the medicines you take. When should you call for help? Call 911 anytime you think you may need emergency care. For example, call if:    · You passed out (lost consciousness).     · You pass maroon or bloody stools.     · You have trouble breathing.    Call your doctor now or seek immediate medical care if:    · You have pain that does not get better after you take pain medicine.     · You are sick to your stomach or cannot drink fluids.     · You have new or worse belly pain.     · You have blood in your stools.     · You have a fever.     · You cannot pass stools or gas.    Watch closely for changes in your health, and be sure to contact your doctor if you have any problems. Where can you learn more? Go to http://dmitry-dawood.info/. Enter E264 in the search box to learn more about \"Colonoscopy: What to Expect at Home. \"  Current as of: March 28, 2018  Content Version: 11.8  © 6902-1562 [employer]. Care instructions adapted under license by [employer] (which disclaims liability or warranty for this information).  If you have questions about a medical condition or this instruction, always ask your healthcare professional. [place] [address] any warranty or liability for your use of this information. DISCHARGE SUMMARY from Nurse    PATIENT INSTRUCTIONS:    After general anesthesia or intravenous sedation, for 24 hours or while taking prescription Narcotics:  · Limit your activities  · Do not drive and operate hazardous machinery  · Do not make important personal or business decisions  · Do  not drink alcoholic beverages  · If you have not urinated within 8 hours after discharge, please contact your surgeon on call. Report the following to your surgeon:  · Excessive pain, swelling, redness or odor of or around the surgical area  · Temperature over 100.5  · Nausea and vomiting lasting longer than 4 hours or if unable to take medications  · Any signs of decreased circulation or nerve impairment to extremity: change in color, persistent  numbness, tingling, coldness or increase pain  · Any questions    What to do at Home:    Recommended activity: Activity as tolerated     If you experience any of the following symptoms, please call your doctor: Temperature of 100.5 or greater, persistent nausea and vomiting, increased abdominal pain, any signs of abnormal bleeding, or with any other questions or concerns      *  Please give a list of your current medications to your Primary Care Provider. *  Please update this list whenever your medications are discontinued, doses are      changed, or new medications (including over-the-counter products) are added. *  Please carry medication information at all times in case of emergency situations. These are general instructions for a healthy lifestyle:    No smoking/ No tobacco products/ Avoid exposure to second hand smoke  Surgeon General's Warning:  Quitting smoking now greatly reduces serious risk to your health.     Obesity, smoking, and sedentary lifestyle greatly increases your risk for illness    A healthy diet, regular physical exercise & weight monitoring are important for maintaining a healthy lifestyle    You may be retaining fluid if you have a history of heart failure or if you experience any of the following symptoms:  Weight gain of 3 pounds or more overnight or 5 pounds in a week, increased swelling in our hands or feet or shortness of breath while lying flat in bed. Please call your doctor as soon as you notice any of these symptoms; do not wait until your next office visit. Recognize signs and symptoms of STROKE:    F-face looks uneven    A-arms unable to move or move unevenly    S-speech slurred or non-existent    T-time-call 911 as soon as signs and symptoms begin-DO NOT go       Back to bed or wait to see if you get better-TIME IS BRAIN. Warning Signs of HEART ATTACK     Call 911 if you have these symptoms:   Chest discomfort. Most heart attacks involve discomfort in the center of the chest that lasts more than a few minutes, or that goes away and comes back. It can feel like uncomfortable pressure, squeezing, fullness, or pain.  Discomfort in other areas of the upper body. Symptoms can include pain or discomfort in one or both arms, the back, neck, jaw, or stomach.  Shortness of breath with or without chest discomfort.  Other signs may include breaking out in a cold sweat, nausea, or lightheadedness. Don't wait more than five minutes to call 911 - MINUTES MATTER! Fast action can save your life. Calling 911 is almost always the fastest way to get lifesaving treatment. Emergency Medical Services staff can begin treatment when they arrive -- up to an hour sooner than if someone gets to the hospital by car. The discharge information has been reviewed with the patient. The patient verbalized understanding.   Discharge medications reviewed with the patient and appropriate educational materials and side effects teaching were provided. ___________________________________________________________________________________________________________________________________        Discharge Instructions/Follow-up Plans:   MD Instructions:     Follow-up with Gastroenterology in 1-2 weeks.     Diet - as tolerated - Soft foods diet. If you develop abdominal pain, nausea, or vomiting, stop eating and sip on clear liquids only until it passes. Activity - ambulate, activity as tolerated.     Resume other home medications.      If problems or questions arise, please call our office at [phone].     Greater than 30 minutes were spent discharging the patient

## 2018-11-20 NOTE — PROGRESS NOTES
GI/Dr. Diego Antonio    Attempted to see patient though she was off of the floor getting KUB. Will re-attempt to see her later today.     Gissel Olivo PA-C  [facility]

## 2018-11-21 ENCOUNTER — PATIENT OUTREACH (OUTPATIENT)
Dept: CASE MANAGEMENT | Age: 68
End: 2018-11-21

## 2018-11-21 NOTE — PROGRESS NOTES
Transition of Care Discharge Follow-up Questionnaire Date/Time of Call: 
 November 21, 2018 12:11PM  
What was the patient hospitalized for? Colon obstruction Does the patient understand his/her diagnosis and/or treatment and what happened during the hospitalization? Care Coordinator spoke with patient Mrs. Nandini Craft who agreed to [employer] of [employer]. Patient states understanding of diagnosis and treatment plan during hospitalization. Did the patient receive discharge instructions? Yes  
CM Assessed Risk for Readmission:  
 
 
Patient stated Risk for Readmission: Low risk for readmit related to complications from Colon obstruction. Patient states she is doing well and will not readmit to hospital. 
  
Review any discharge instructions (see discharge instructions/AVS in [employer]). Ask patient if they understand these. Do they have any questions? Care Coordinator reviewed discharge medications, diet and discharge instructions with patient who verbalized understanding of discharge instructions. Patient educated on the importance of medication compliance and reporting medication side effects to PCP/Specialist.  
  
Were home services ordered (nursing, PT, OT, ST, etc.)? No home health services ordered. If so, has the first visit occurred? If not, why? (Assist with coordination of services if necessary. ) 
 NA Was any DME ordered? No durable medical equipment ordered. If so, has it been received? If not, why?  (Assist patient in obtaining DME orders &/or equipment if necessary. ) NA Complete a review of all medications (new, continued and discontinued meds per the D/C instructions and medication tab in [employer]). Care Coordinator reviewed all medications with patient per connect care who verbalized understanding of medications. Start: nystatin (MYCOSTATIN) powder Were all new prescriptions filled? If not, why?  (Assist patient in obtaining medications if necessary  escalate for CCM &/or SW if ongoing issues are verbalized by pt or anticipated) Yes Does the patient understand the purpose and dosing instructions for all medications? (If patient has questions, provide explanation and education.) Yes-Patient states understanding of medications and dosing instructions. Care Coordinator educated patient on the importance of medication compliance and reporting medication side effects to PCP/Specialist.  
  
Does the patient have any problems in performing ADLs? (If patient is unable to perform ADLs  what is the limiting factor(s)? Do they have a support system that can assist? If no support system is present, discuss possible assistance that they may be able to obtain. Escalate for CCM/SW if ongoing issues are verbalized by pt or anticipated) Patient states she is independent with ADL's and ambulation. Patient states she is doing pretty good and is up moving around staying active. Does the patient have all follow-up appointments scheduled? 7 day f/up with PCP?  
(f/up with PCP may be w/in 14 days if patient has a f/up with their specialist w/in 7 days) 7-14 day f/up with specialist?  
(or per discharge instructions) If f/up has not been made  what actions has the care coordinator made to accomplish this? Has transportation been arranged? No follow appointment scheduled with PCP, patient educated on the importance of scheduling follow up appointment with PCP within 7 days of hospital discharge. Patient states she will schedule follow up with PCP at a later time. 1 Patient advised -Follow up with Laz Mitchell MD (Gastroenterology); The GI nurse will call you to schedule a follow up appt. NA Yes Any other questions or concerns expressed by the patient? No further needs identified: Patient instructed to call Care Coordinator if further questions or concerns arise. Schedule next appointment with SUSHMA Power or refer to RN Case Manager/ per the workflow guidelines. When is care coordinators next follow-up call scheduled? If referred for CCM  what RN care manager was the referral assigned? NA Patient states no additional needs, No follow up call scheduled per patient request.Care Coordinator provided contact information if assistance is needed for concerns or questions. NA  
YOSELIN Call Completed By: CORI Amador [facility] Care Coordinator This note will not be viewable in [address]

## 2020-01-24 ENCOUNTER — HOSPITAL ENCOUNTER (EMERGENCY)
Age: 70
Discharge: HOME | End: 2020-01-24
Payer: MEDICARE

## 2020-01-24 ENCOUNTER — APPOINTMENT (OUTPATIENT)
Dept: GENERAL RADIOLOGY | Age: 70
End: 2020-01-24
Payer: MEDICARE

## 2020-01-24 VITALS
OXYGEN SATURATION: 92 % | SYSTOLIC BLOOD PRESSURE: 140 MMHG | RESPIRATION RATE: 20 BRPM | TEMPERATURE: 98.3 F | DIASTOLIC BLOOD PRESSURE: 82 MMHG | HEART RATE: 89 BPM

## 2020-01-24 DIAGNOSIS — S81.012A KNEE LACERATION, LEFT, INITIAL ENCOUNTER: Primary | ICD-10-CM

## 2020-01-24 PROCEDURE — 90715 TDAP VACCINE 7 YRS/> IM: CPT

## 2020-01-24 PROCEDURE — 90471 IMMUNIZATION ADMIN: CPT

## 2020-01-24 PROCEDURE — 73562 X-RAY EXAM OF KNEE 3: CPT

## 2020-01-24 PROCEDURE — 74011250636 HC RX REV CODE- 250/636

## 2020-01-24 PROCEDURE — 99284 EMERGENCY DEPT VISIT MOD MDM: CPT

## 2020-01-24 PROCEDURE — 75810000293 HC SIMP/SUPERF WND  RPR

## 2020-01-24 RX ADMIN — TETANUS TOXOID, REDUCED DIPHTHERIA TOXOID AND ACELLULAR PERTUSSIS VACCINE, ADSORBED 0.5 ML: 5; 2.5; 8; 8; 2.5 SUSPENSION INTRAMUSCULAR at 15:21

## 2020-01-24 NOTE — ED NOTES
I have reviewed discharge instructions with the patient. The patient verbalized understanding. Patient left ED via Discharge Method: wheelchair to Home with spouse. Opportunity for questions and clarification provided. Patient given 0 scripts. To continue your aftercare when you leave the hospital, you may receive an automated call from our care team to check in on how you are doing. This is a free service and part of our promise to provide the best care and service to meet your aftercare needs.  If you have questions, or wish to unsubscribe from this service please call [phone]. Thank you for Choosing our [address] [place] [address] Emergency Department.

## 2020-01-24 NOTE — ED PROVIDER NOTES
79-year-old female fell at home. Patient has a hard boot on her right ankle and her left leg is wrapped from previous fall. The patient states she stepped on a toy which made her loss to lose her balance she came down on her left knee lacerating the knee. The history is provided by the patient. Knee Injury    This is a new problem. The current episode started 1 to 2 hours ago. The problem occurs constantly. The problem has not changed since onset. The pain is present in the left knee. The pain is at a severity of 7/10. The pain is moderate. She has tried nothing for the symptoms. There has been a history of trauma. Past Medical History:   Diagnosis Date    Anemia     iron deficiency. getting iron infusions--Dr. Ambar Carreno Arrhythmia     pt states \" fast HR\"--but unsure if anything else--- followed by [facility] cardio    Chronic pain     COPD (chronic obstructive pulmonary disease) ([facility] [address] \"years\"    centrilobular emphysema with air trapping. daily inhaler--- home O2 prn--- followed by dr Lyudmila Sood [facility]) 2007    type2-- sqbs avg am-- --- no hypo    GERD (gastroesophageal reflux disease) \"years\"    controlled with med    History of echocardiogram 02/02/2017    EF 30-87%, grade 1 diastolic dysfnction. no significant valve disease, normal RV size and function    History of nuclear stress test 08/22/2017    Findings consistent with no ischemia. Diaphragmatic attenuation is noted.  Hyperlipidemia \"years\"    Hypertension     controlled with med    Joint pain     back, right shoulder, knees    Osteoarthritis of right knee 11/30/2010    [facility]AD [facility]peripheral artery disease) ([facility])     Palpitations     Holter monitor 8/16/17--rhythm is sinus tachycardia. Avg. heart rate 106 bpm, minimum is 91 bpm.  Isolated APDs and couplets. Isolated nonsustained PAT and VPDs. No complex ventricular arrhythmia.     Pneumonia hospitalized May, June 2010    Renal insufficiency     [facility]ed [facility] (Dr. Rj Freeman)---- NOT SEEN IN 10 YRS -- PER PT    Sleep apnea     refuses to use CPAP    Thyroid nodule     followed by [facility]       Past Surgical History:   Procedure Laterality Date    ABDOMEN SURGERY PROC UNLISTED  2010    CHOLECYSTECTOMY    COLONOSCOPY N/A 9/25/2017    COLONOSCOPY / BMI=37 performed by Kalyn Lopez MD at [facility] ENDOSCOPY    COLONOSCOPY N/A 11/18/2018    COLONOSCOPY performed by Michalea Lamar MD at [address] [place] [address] N/A 11/19/2018    SIGMOIDOSCOPY FLEXIBLE performed by Jarrell Ruiz MD at [facility] ENDOSCOPY    HC BLD CARPEL TUNNEL RELEASE      Bilateral    HX BREAST AUGMENTATION  2005    reduction    HX GYN      Chronic UTIs, Has had multiple cystoscopes    HX HYSTERECTOMY      HX ORTHOPAEDIC  1991    lt. broken foot repair/metal plate-pins    HX ORTHOPAEDIC  2009    RIGHTKNEE REPLACEMENT    HX SALPINGO-OOPHORECTOMY  1990s    HX TOTAL ABDOMINAL HYSTERECTOMY  1970s    HX UROLOGICAL  1990s    bladder tacking    LAP,CHOLECYSTECTOMY  2001    REPAIR OF RECTOCELE  1990s         Family History:   Problem Relation Age of Onset    Heart Disease Father     Cancer Father     Malignant Hyperthermia Neg Hx     Pseudocholinesterase Deficiency Neg Hx     Delayed Awakening Neg Hx     Post-op Nausea/Vomiting Neg Hx     Emergence Delirium Neg Hx     Post-op Cognitive Dysfunction Neg Hx     Other Neg Hx        Social History     Socioeconomic History    Marital status:      Spouse name: Not on file    Number of children: Not on file    Years of education: Not on file    Highest education level: Not on file   Occupational History    Not on file   Social Needs    Financial resource strain: Not on file    Food insecurity:     Worry: Not on file     Inability: Not on file    Transportation needs:     Medical: Not on file     Non-medical: Not on file   Tobacco Use    Smoking status: Former Smoker     Packs/day: 0.50     Years: 30.00     Pack years: 15.00 Types: Cigarettes     Last attempt to quit: 1995     Years since quittin.0    Smokeless tobacco: Never Used   Substance and Sexual Activity    Alcohol use: No    Drug use: No    Sexual activity: Not on file   Lifestyle    Physical activity:     Days per week: Not on file     Minutes per session: Not on file    Stress: Not on file   Relationships    Social connections:     Talks on phone: Not on file     Gets together: Not on file     Attends Congregational service: Not on file     Active member of club or organization: Not on file     Attends meetings of clubs or organizations: Not on file     Relationship status: Not on file    Intimate partner violence:     Fear of current or ex partner: Not on file     Emotionally abused: Not on file     Physically abused: Not on file     Forced sexual activity: Not on file   Other Topics Concern    Not on file   Social History Narrative    Not on file         ALLERGIES: Pcn [penicillins]; Sulfa (sulfonamide antibiotics); Advil [ibuprofen]; Celebrex [celecoxib]; and Pneumovax 23 [pneumococcal 23-nisha ps vaccine]    Review of Systems   Constitutional: Negative. Negative for activity change. HENT: Negative. Eyes: Negative. Respiratory: Negative. Cardiovascular: Negative. Gastrointestinal: Negative. Genitourinary: Negative. Musculoskeletal: Negative. Skin: Negative. Neurological: Negative. Psychiatric/Behavioral: Negative. All other systems reviewed and are negative. Vitals:    20 1418   Pulse: (!) 102   Resp: 20   Temp: 98.3 °F (36.8 °C)            Physical Exam  Vitals signs and nursing note reviewed. Constitutional:       General: She is not in acute distress. Appearance: She is well-developed. She is not diaphoretic. HENT:      Head: Normocephalic and atraumatic.       Right Ear: External ear normal.      Left Ear: External ear normal.      Nose: Nose normal.      Mouth/Throat:      Pharynx: No oropharyngeal exudate. Eyes:      General: No scleral icterus. Right eye: No discharge. Left eye: No discharge. Conjunctiva/sclera: Conjunctivae normal.      Pupils: Pupils are equal, round, and reactive to light. Neck:      Musculoskeletal: Normal range of motion and neck supple. Vascular: No JVD. Trachea: No tracheal deviation. Cardiovascular:      Rate and Rhythm: Normal rate and regular rhythm. Pulmonary:      Effort: Pulmonary effort is normal. No respiratory distress. Breath sounds: Normal breath sounds. No stridor. No wheezing. Chest:      Chest wall: No tenderness. Abdominal:      General: Bowel sounds are normal. There is no distension. Palpations: Abdomen is soft. There is no mass. Tenderness: There is no tenderness. Musculoskeletal: Normal range of motion. Left knee: She exhibits laceration. Tenderness found. Skin:     General: Skin is warm and dry. Coloration: Skin is not pale. Findings: No erythema or rash. Neurological:      Mental Status: She is alert and oriented to person, place, and time. Cranial Nerves: No cranial nerve deficit. Psychiatric:         Behavior: Behavior normal.         Thought Content: Thought content normal.          MDM  Number of Diagnoses or Management Options  Diagnosis management comments: X-rays are normal laceration repaired 8 cm laceration across the knee. Wound Repair  Date/Time: 1/24/2020 4:03 PM  Performed by: attendingPreparation: skin prepped with Betadine and skin prepped with Shur-Clens  Pre-procedure re-eval: Immediately prior to the procedure, the patient was reevaluated and found suitable for the planned procedure and any planned medications. Time out: Immediately prior to the procedure a time out was called to verify the correct patient, procedure, equipment, staff and marking as appropriate. .  Location details: left knee  Wound length:7.6 - 12.5 cm  Anesthesia: local infiltration    Anesthesia:  Local Anesthetic: lidocaine 1% without epinephrine  Foreign bodies: no foreign bodies  Irrigation solution: saline  Irrigation method: syringe  Skin closure: 4-0 nylon and Steri-Strips (3-0 nylon)  Number of sutures: 15  Technique: vertical mattress  Approximation: loose  My total time at bedside, performing this procedure was 31-45 minutes.

## 2020-01-24 NOTE — DISCHARGE INSTRUCTIONS

## 2020-01-24 NOTE — ED TRIAGE NOTES
Patient arrives via EMS from home after fall today. Patient was walking and went down on left knee and has laceration. Bandaged upon arrival. /87, . Denies LOC or hitting head. States trip and fall. Denies dizziness.

## 2022-03-18 PROBLEM — R10.9 ABDOMINAL PAIN: Status: ACTIVE | Noted: 2018-11-16

## 2022-03-20 PROBLEM — K56.609 COLON OBSTRUCTION (HCC): Status: ACTIVE | Noted: 2018-11-18

## 2022-10-03 ENCOUNTER — PREP FOR PROCEDURE (OUTPATIENT)
Dept: ADMINISTRATIVE | Age: 72
End: 2022-10-03

## 2022-10-04 RX ORDER — SODIUM CHLORIDE 9 MG/ML
INJECTION, SOLUTION INTRAVENOUS PRN
OUTPATIENT
Start: 2022-10-04

## 2022-10-04 RX ORDER — SODIUM CHLORIDE 0.9 % (FLUSH) 0.9 %
5-40 SYRINGE (ML) INJECTION PRN
OUTPATIENT
Start: 2022-10-04

## 2022-10-04 RX ORDER — SODIUM CHLORIDE 0.9 % (FLUSH) 0.9 %
5-40 SYRINGE (ML) INJECTION EVERY 12 HOURS SCHEDULED
OUTPATIENT
Start: 2022-10-04

## 2022-11-03 RX ORDER — TORSEMIDE 20 MG/1
20 TABLET ORAL DAILY
COMMUNITY

## 2022-11-03 RX ORDER — INSULIN GLARGINE 100 [IU]/ML
26 INJECTION, SOLUTION SUBCUTANEOUS
COMMUNITY

## 2022-11-03 NOTE — PERIOP NOTE
Patient verified name, , and procedure. Type: 1a; abbreviated assessment per anesthesia guidelines  Labs per surgeon: Unknown  Labs per anesthesia: SQBS DOS       Instructed pt that they will be notified by the Gi Lab for time of arrival. If any questions please call the GI lab at [phone]. Follow diet and prep instructions per office. May have clear liquids until 4 hours prior to time of arrival.    [place] Roxo or shower the night before and the am of surgery with antibacterial soap. No lotions, oils, powders, cologne on skin. No make up, eye make up or jewelry. Wear loose fitting comfortable, clean clothing. Must have adult present in building the entire time . Medications for the day of procedure: use daily inhalers that morning before coming to hospital and bring rescue inhaler DOS, allopurinol, amlodipine, diltiazem, flonase, hydrocodone/acetaminophen, isordil, omeprazole, LANTUS: use only 20 units of lantus morning of procedure. The following discharge instructions reviewed with patient: medication given during procedure may cause drowsiness for several hours, therefore, do not drive or operate machinery for remainder of the day, no alcohol on the day of your procedure, resume regular diet and activity unless otherwise directed, for mild sore throat you may use Cepacol throat lozenges or warm salt water gargles as needed, call your physician for any problems or questions. Patient verbalizes understanding.

## 2022-11-03 NOTE — PERIOP NOTE
Dear Mrs. Sommer,      Thank you for completing your phone assessment with me today. Here are your requested procedure instructions. Please call #[phone] with any questions/concerns. Your procedure is scheduled at [address] [facility], [address] [place] [address], [id number]. Please arrive at MAIN Entrance; GI Lab (#[phone]) will call you on the business day before your surgery with your arrival time. If you have any questions on the day of procedure, please call the GI dept. at the telephone number above. Follow procedure instructions for EGD or colonoscopy prep received from the GI/Surgeon office. If you have not received instructions from GI office, please call their office to receive prep instructions for procedure. Please take these medications on the morning of the procedure with a small sip of water: use daily inhalers that morning before coming to hospital and bring rescue inhaler DOS, allopurinol, amlodipine, diltiazem, flonase, hydrocodone/acetaminophen, isordil, omeprazole. LANTUS: use only 20 units of lantus morning of procedure. .      Please stop all vitamins/supplements 7 days prior to the procedure and stop all NSAIDS (ibuprofen, naproxen, aleve, motrin, advil) 5 days before your procedure. A responsible adult must drive you to the hospital, remain in the building during the procedure and you will need adult supervision for 24 hours after anesthesia. Please use a non-moisturizing soap the night before the procedure and on the morning of the procedure. Do NOT wear: make-up, nail polish, lotions, cologne, perfumes, powders or oil on your skin. All piercings/metal/jewelry must be removed prior to arrival.  If you wear contacts then you will need to bring a case to store them in or wear your glasses. Deodorant is acceptable with all EGDs and/or colonoscopies.      Medication given during procedure may cause drowsiness for several hours, therefore, do not drive or operate machinery for remainder of the day. You may not drink alcohol on the day of your procedure, please resume regular diet and activity unless otherwise directed. For EGD: For mild sore throat you may use Cepacol throat lozenges or warm salt water gargles as needed, call your physician for any problems or questions. Patient verbalizes understanding. Please leave all your valuables at home but be sure to bring your insurance card and ID on the day of surgery for registration/identification. Our Guide to Surgery with additional information can be found:  [email or web address]    Emailed to: [email or web address]

## 2022-11-03 NOTE — PERIOP NOTE
Patient recently out of [facility] with diagnosis of pneumonia. Patient states she has called [facility] office several times to let them know of diagnosis before having procedure Monday 11/7/2022 but has not heard from office as of this date. Email sent to Nilay Balderas and El Dunlap at [facility] them of recent pneumonia diagnosis and recent release from hospital on 10/26/2022; cc'd Robert Alva and Emi Figueroa on email.

## 2022-11-23 ENCOUNTER — PREP FOR PROCEDURE (OUTPATIENT)
Dept: ADMINISTRATIVE | Age: 72
End: 2022-11-23

## 2022-12-04 RX ORDER — SODIUM CHLORIDE 0.9 % (FLUSH) 0.9 %
5-40 SYRINGE (ML) INJECTION EVERY 12 HOURS SCHEDULED
Status: CANCELLED | OUTPATIENT
Start: 2022-12-04

## 2022-12-04 RX ORDER — SODIUM CHLORIDE 9 MG/ML
INJECTION, SOLUTION INTRAVENOUS PRN
Status: CANCELLED | OUTPATIENT
Start: 2022-12-04

## 2022-12-04 RX ORDER — SODIUM CHLORIDE 0.9 % (FLUSH) 0.9 %
5-40 SYRINGE (ML) INJECTION PRN
Status: CANCELLED | OUTPATIENT
Start: 2022-12-04

## 2022-12-05 ENCOUNTER — ANESTHESIA EVENT (OUTPATIENT)
Dept: ENDOSCOPY | Age: 72
End: 2022-12-05
Payer: MEDICARE

## 2022-12-05 NOTE — PROGRESS NOTES
Pt's procedure, arrival time 0945,  that will be staying with pt duration of the procedure, location, and other pre-assessment questions reviewed and confirmed with pt.

## 2022-12-06 ENCOUNTER — HOSPITAL ENCOUNTER (OUTPATIENT)
Age: 72
Setting detail: OUTPATIENT SURGERY
Discharge: HOME | End: 2022-12-06
Attending: INTERNAL MEDICINE | Admitting: INTERNAL MEDICINE
Payer: MEDICARE

## 2022-12-06 ENCOUNTER — ANESTHESIA (OUTPATIENT)
Dept: ENDOSCOPY | Age: 72
End: 2022-12-06
Payer: MEDICARE

## 2022-12-06 VITALS
RESPIRATION RATE: 18 BRPM | OXYGEN SATURATION: 92 % | SYSTOLIC BLOOD PRESSURE: 139 MMHG | BODY MASS INDEX: 36.65 KG/M2 | WEIGHT: 220 LBS | TEMPERATURE: 98.6 F | HEART RATE: 66 BPM | DIASTOLIC BLOOD PRESSURE: 71 MMHG | HEIGHT: 65 IN

## 2022-12-06 LAB
GLUCOSE BLD STRIP.AUTO-MCNC: 140 MG/DL (ref 65–100)
SERVICE CMNT-IMP: ABNORMAL

## 2022-12-06 PROCEDURE — 2500000003 HC RX 250 WO HCPCS

## 2022-12-06 PROCEDURE — 3700000000 HC ANESTHESIA ATTENDED CARE: Performed by: INTERNAL MEDICINE

## 2022-12-06 PROCEDURE — 3700000001 HC ADD 15 MINUTES (ANESTHESIA): Performed by: INTERNAL MEDICINE

## 2022-12-06 PROCEDURE — 2709999900 HC NON-CHARGEABLE SUPPLY: Performed by: INTERNAL MEDICINE

## 2022-12-06 PROCEDURE — 82962 GLUCOSE BLOOD TEST: CPT

## 2022-12-06 PROCEDURE — 7100000010 HC PHASE II RECOVERY - FIRST 15 MIN: Performed by: INTERNAL MEDICINE

## 2022-12-06 PROCEDURE — 3609017100 HC EGD: Performed by: INTERNAL MEDICINE

## 2022-12-06 PROCEDURE — 2720000010 HC SURG SUPPLY STERILE: Performed by: INTERNAL MEDICINE

## 2022-12-06 PROCEDURE — 6360000002 HC RX W HCPCS

## 2022-12-06 PROCEDURE — 7100000011 HC PHASE II RECOVERY - ADDTL 15 MIN: Performed by: INTERNAL MEDICINE

## 2022-12-06 PROCEDURE — 2580000003 HC RX 258: Performed by: ANESTHESIOLOGY

## 2022-12-06 RX ORDER — SODIUM CHLORIDE 0.9 % (FLUSH) 0.9 %
5-40 SYRINGE (ML) INJECTION PRN
Status: DISCONTINUED | OUTPATIENT
Start: 2022-12-06 | End: 2022-12-06 | Stop reason: HOSPADM

## 2022-12-06 RX ORDER — SODIUM CHLORIDE, SODIUM LACTATE, POTASSIUM CHLORIDE, CALCIUM CHLORIDE 600; 310; 30; 20 MG/100ML; MG/100ML; MG/100ML; MG/100ML
INJECTION, SOLUTION INTRAVENOUS CONTINUOUS
Status: DISCONTINUED | OUTPATIENT
Start: 2022-12-06 | End: 2022-12-06 | Stop reason: HOSPADM

## 2022-12-06 RX ORDER — LIDOCAINE HYDROCHLORIDE 10 MG/ML
1 INJECTION, SOLUTION INFILTRATION; PERINEURAL
Status: DISCONTINUED | OUTPATIENT
Start: 2022-12-06 | End: 2022-12-06 | Stop reason: HOSPADM

## 2022-12-06 RX ORDER — PROPOFOL 10 MG/ML
INJECTION, EMULSION INTRAVENOUS PRN
Status: DISCONTINUED | OUTPATIENT
Start: 2022-12-06 | End: 2022-12-06 | Stop reason: SDUPTHER

## 2022-12-06 RX ORDER — ONDANSETRON 2 MG/ML
4 INJECTION INTRAMUSCULAR; INTRAVENOUS
Status: DISCONTINUED | OUTPATIENT
Start: 2022-12-06 | End: 2022-12-06 | Stop reason: HOSPADM

## 2022-12-06 RX ORDER — SODIUM CHLORIDE 9 MG/ML
INJECTION, SOLUTION INTRAVENOUS PRN
Status: DISCONTINUED | OUTPATIENT
Start: 2022-12-06 | End: 2022-12-06 | Stop reason: HOSPADM

## 2022-12-06 RX ORDER — SODIUM CHLORIDE 0.9 % (FLUSH) 0.9 %
5-40 SYRINGE (ML) INJECTION EVERY 12 HOURS SCHEDULED
Status: DISCONTINUED | OUTPATIENT
Start: 2022-12-06 | End: 2022-12-06 | Stop reason: HOSPADM

## 2022-12-06 RX ORDER — LIDOCAINE HYDROCHLORIDE 20 MG/ML
INJECTION, SOLUTION EPIDURAL; INFILTRATION; INTRACAUDAL; PERINEURAL PRN
Status: DISCONTINUED | OUTPATIENT
Start: 2022-12-06 | End: 2022-12-06 | Stop reason: SDUPTHER

## 2022-12-06 RX ORDER — PROPOFOL 10 MG/ML
INJECTION, EMULSION INTRAVENOUS CONTINUOUS PRN
Status: DISCONTINUED | OUTPATIENT
Start: 2022-12-06 | End: 2022-12-06 | Stop reason: SDUPTHER

## 2022-12-06 RX ORDER — EPHEDRINE SULFATE/0.9% NACL/PF 50 MG/5 ML
SYRINGE (ML) INTRAVENOUS PRN
Status: DISCONTINUED | OUTPATIENT
Start: 2022-12-06 | End: 2022-12-06 | Stop reason: SDUPTHER

## 2022-12-06 RX ADMIN — SODIUM CHLORIDE, POTASSIUM CHLORIDE, SODIUM LACTATE AND CALCIUM CHLORIDE: 600; 310; 30; 20 INJECTION, SOLUTION INTRAVENOUS at 10:54

## 2022-12-06 RX ADMIN — PROPOFOL 20 MG: 10 INJECTION, EMULSION INTRAVENOUS at 11:27

## 2022-12-06 RX ADMIN — PROPOFOL 30 MG: 10 INJECTION, EMULSION INTRAVENOUS at 11:25

## 2022-12-06 RX ADMIN — Medication 10 MG: at 11:28

## 2022-12-06 RX ADMIN — PROPOFOL 50 MG: 10 INJECTION, EMULSION INTRAVENOUS at 11:24

## 2022-12-06 RX ADMIN — PROPOFOL 160 MCG/KG/MIN: 10 INJECTION, EMULSION INTRAVENOUS at 11:24

## 2022-12-06 RX ADMIN — PROPOFOL 20 MG: 10 INJECTION, EMULSION INTRAVENOUS at 11:26

## 2022-12-06 RX ADMIN — LIDOCAINE HYDROCHLORIDE 100 MG: 20 INJECTION, SOLUTION EPIDURAL; INFILTRATION; INTRACAUDAL; PERINEURAL at 11:24

## 2022-12-06 ASSESSMENT — PAIN - FUNCTIONAL ASSESSMENT: PAIN_FUNCTIONAL_ASSESSMENT: NONE - DENIES PAIN

## 2022-12-06 NOTE — ANESTHESIA PRE PROCEDURE
Department of Anesthesiology  Preprocedure Note       Name:  Lesia Doan   Age:  67 y.o.  :  1950                                          MRN:  [id number]         Date:  2022      Surgeon: Dennise Dickson):  Faith Chacon MD    Procedure: Procedure(s):  EGD/ESOPHAGOGASTRODUODENOSCOPY/37 *interviewed    Medications prior to admission:   Prior to Admission medications    Medication Sig Start Date End Date Taking? Authorizing Provider   insulin glargine (LANTUS) 100 UNIT/ML injection vial Inject 26 Units into the skin every morning (before breakfast)   Yes Historical Provider, MD   torsemide (DEMADEX) 20 MG tablet Take 20 mg by mouth daily   Yes Historical Provider, MD   albuterol sulfate  (90 Base) MCG/ACT inhaler Inhale 2 puffs into the lungs every 4 hours as needed    Ar Automatic Reconciliation   allopurinol (ZYLOPRIM) 100 MG tablet Take 100 mg by mouth    Ar Automatic Reconciliation   amLODIPine (NORVASC) 5 MG tablet Take 5 mg by mouth    Ar Automatic Reconciliation   dilTIAZem (CARDIZEM) 30 MG tablet Take 30 mg by mouth 2 times daily    Ar Automatic Reconciliation   Fe Fum-FePoly-FA-Vit C-Vit B3 (INTEGRA F) 125-1 MG CAPS Take 1 tablet by mouth 2 times daily    Ar Automatic Reconciliation   fluticasone (FLONASE) 50 MCG/ACT nasal spray 2 sprays by Nasal route 2 times daily    Ar Automatic Reconciliation   HYDROcodone-acetaminophen (NORCO)  MG per tablet Take 1 tablet by mouth 3 times daily.     Ar Automatic Reconciliation   isosorbide dinitrate (ISORDIL) 30 MG tablet Take 20 mg by mouth 3 times daily    Ar Automatic Reconciliation   levalbuterol (XOPENEX HFA) 45 MCG/ACT inhaler Inhale 2 puffs into the lungs 2 times daily    Ar Automatic Reconciliation   montelukast (SINGULAIR) 5 MG chewable tablet Take 5 mg by mouth nightly    Ar Automatic Reconciliation   nystatin (MYCOSTATIN) 939021 UNIT/GM powder Apply topically as needed 18   Ar Automatic Reconciliation   omeprazole (PRILOSEC OTC) 20 MG tablet Take 40 mg by mouth daily    Ar Automatic Reconciliation   tiotropium (SPIRIVA) 18 MCG inhalation capsule Inhale 1 capsule into the lungs    Ar Automatic Reconciliation       Current medications:    Current Facility-Administered Medications   Medication Dose Route Frequency Provider Last Rate Last Admin    lidocaine 1 % injection 1 mL  1 mL IntraDERmal Once PRN Richard Kumar MD        famotidine (PEPCID) 20 mg in sodium chloride (PF) 0.9 % 10 mL injection  20 mg IntraVENous Once Richard Kumar MD        lactated ringers infusion   IntraVENous Continuous Richard Kumar  mL/hr at 12/06/22 1054 New Bag at 12/06/22 1054    sodium chloride flush 0.9 % injection 5-40 mL  5-40 mL IntraVENous 2 times per day Richard Kumar MD        sodium chloride flush 0.9 % injection 5-40 mL  5-40 mL IntraVENous PRN Richard Kumar MD        sodium chloride flush 0.9 % injection 5-40 mL  5-40 mL IntraVENous 2 times per day Richard Kumar MD        sodium chloride flush 0.9 % injection 5-40 mL  5-40 mL IntraVENous PRN Richard Kumar MD        0.9 % sodium chloride infusion   IntraVENous PRN Richard Kumar MD        ondansetron [facility] injection 4 mg  4 mg IntraVENous Once PRN Richard Kumar MD           Allergies:     Allergies   Allergen Reactions    Celecoxib Shortness Of Breath    Penicillins Anaphylaxis    Sulfa Antibiotics Other (See Comments)     Artur-Ekvin syndrome    Ibuprofen Itching and Nausea Only     Rash    Pneumococcal Vaccine Swelling       Problem List:    Patient Active Problem List   Diagnosis Code    HTN (hypertension) I10    Hypercholesteremia E78.00    Anemia, unspecified D64.9    Abdominal pain R10.9    COPD (chronic obstructive pulmonary disease) (HCC) J44.9    DM (diabetes mellitus) (HCC) E11.9    Thyroid nodule E04.1    GERD (gastroesophageal reflux disease) K21.9    Iron deficiency anemia D50.9    Osteoarthritis of right knee M17.11    Total knee replacement status Z96.659    Colon obstruction ([facility] [address] K56.609       Past Medical History:        Diagnosis Date    Anemia     iron deficiency. getting iron infusions--Dr. Arthur Mcleod Arrhythmia     pt states \" fast HR\"--but unsure if anything else--- followed by [facility] cardio    Cancer [facility])     skin CA removed from chest - no issues since    Chronic pain     COPD (chronic obstructive pulmonary disease) ([facility] [address] \"years\"    centrilobular emphysema with air trapping. daily inhaler--- home O2 prn--- followed by dr Selina Odonnell Diabetes [facility]) 2007    type2-- sqbs avg am-- --- no hypo    GERD (gastroesophageal reflux disease) \"years\"    controlled with med    History of blood transfusion     no rxns    History of echocardiogram 02/02/2017    EF 26-50%, grade 1 diastolic dysfnction. no significant valve disease, normal RV size and function    History of nuclear stress test 08/22/2017    Findings consistent with no ischemia. Diaphragmatic attenuation is noted.  Hyperlipidemia \"years\"    Hypertension     controlled with med    Joint pain     back, right shoulder, knees    Osteoarthritis of right knee 11/30/2010    PAD (peripheral artery disease) ([facility]     Palpitations     Holter monitor 8/16/17--rhythm is sinus tachycardia. Avg. heart rate 106 bpm, minimum is 91 bpm.  Isolated APDs and couplets. Isolated nonsustained PAT and VPDs. No complex ventricular arrhythmia.  Pneumonia 10/21/2022    [facility]; had pneumonia previously in 2010 and was hospitalized then as well    Presence of Watchman left atrial appendage closure device     ? ?    Renal insufficiency     Followed by [facility] (Dr. William Baron)---- NOT SEEN IN 10 YRS -- PER PT    Sleep apnea     refuses to use CPAP    Thyroid nodule     followed by [facility]       Past Surgical History:        Procedure Laterality Date    BLD CARPEL TUNNEL RELEASE      Bilateral    BREAST SURGERY  2005    reduction    COLONOSCOPY N/A 2017    COLONOSCOPY / BMI=37 performed by Chen Villarreal MD at [address] [address] N/A 2018    COLONOSCOPY performed by Jonathan Dong MD at [address] [facility] N/A 2018    SIGMOIDOSCOPY FLEXIBLE performed by Rafa Davis MD at [facility] ENDOSCOPY    GYN      Chronic UTIs, Has had multiple cystoscopes    HYSTERECTOMY (CERVIX STATUS UNKNOWN)      HYSTERECTOMY, TOTAL ABDOMINAL (CERVIX REMOVED)      LAP,CHOLECYSTECTOMY     [address] [facility] broken foot repair/metal plate-pins    ORTHOPEDIC SURGERY      RIGHTKNEE REPLACEMENT    RI ABDOMEN SURGERY PROC UNLISTED      CHOLECYSTECTOMY    REPAIR OF RECTOCELE      SALPINGO-OOPHORECTOMY      UROLOGICAL SURGERY      bladder tacking       Social History:    Social History     Tobacco Use    Smoking status: Former     Packs/day: 0.50     Types: Cigarettes     Quit date: 1995     Years since quittin.9    Smokeless tobacco: Never   Substance Use Topics    Alcohol use: No                                Counseling given: Not Answered      Vital Signs (Current):   Vitals:    22 0750 22 1048   BP:  (!) 154/71   Pulse:  65   Resp:  18   Temp:  98.2 °F (36.8 °C)   TempSrc:  Oral   SpO2:  94%   Weight: 220 lb (99.8 kg) 220 lb (99.8 kg)   Height: 5' 5\" (1.651 m) 5' 5\" (1.651 m)                                              BP Readings from Last 3 Encounters:   22 (!) 154/71       NPO Status: Time of last liquid consumption: 2300                        Time of last solid consumption: 230                        Date of last liquid consumption: 22                        Date of last solid food consumption: 22    BMI:   Wt Readings from Last 3 Encounters:   22 220 lb (99.8 kg)     Body mass index is 36.61 kg/m².     CBC: No results found for: WBC, RBC, HGB, HCT, MCV, RDW, PLT    CMP: No results found for: NA, K, CL, CO2, BUN, CREATININE, GFRAA, AGRATIO, LABGLOM, GLUCOSE, GLU, PROT, CALCIUM, BILITOT, ALKPHOS, AST, ALT    POC Tests: No results for input(s): POCGLU, POCNA, POCK, POCCL, POCBUN, POCHEMO, POCHCT in the last 72 hours. Coags: No results found for: PROTIME, INR, APTT    HCG (If Applicable): No results found for: PREGTESTUR, PREGSERUM, HCG, HCGQUANT     ABGs: No results found for: PHART, PO2ART, ALI8HXD, DTE7KNO, BEART, Z4CUKDUZ     Type & Screen (If Applicable):  No results found for: LABABO, LABRH    Drug/Infectious Status (If Applicable):  No results found for: HIV, HEPCAB    COVID-19 Screening (If Applicable): No results found for: COVID19        Anesthesia Evaluation  Patient summary reviewed and Nursing notes reviewed no history of anesthetic complications:   Airway: Mallampati: II  TM distance: >3 FB   Neck ROM: full  Mouth opening: > = 3 FB   Dental: normal exam         Pulmonary:   (+) COPD:  sleep apnea (2 liters oxygen qhs): on noncompliant,  rhonchi:bilateral                            Cardiovascular:  Exercise tolerance: poor (<4 METS),   (+) hypertension:, hyperlipidemia        Rhythm: regular  Rate: normal                 ROS comment: EF 55%     Neuro/Psych:               GI/Hepatic/Renal:   (+) GERD:, morbid obesity         ROS comment: Previous duod AVM. Endo/Other:    (+) DiabetesType II DM, using insulin, : arthritis:., .                  ROS comment: Anemia  Gout   Abdominal:             Vascular:   + PVD, aortic or cerebral, . Other Findings:           Anesthesia Plan      TIVA     ASA 3       Induction: intravenous. Anesthetic plan and risks discussed with patient.                         Eleonora Moreno MD   12/6/2022

## 2022-12-06 NOTE — ANESTHESIA POSTPROCEDURE EVALUATION
Department of Anesthesiology  Postprocedure Note    Patient: Lesia Doan  MRN: [id number]  YOB: 1950  Date of evaluation: 12/6/2022      Procedure Summary     Date: 12/06/22 Room / Location: [facility] ENDO 05 / [facility] ENDOSCOPY    Anesthesia Start: 1115 Anesthesia Stop: 9481    Procedure: EGD/ESOPHAGOGASTRODUODENOSCOPY/APC (Upper GI Region) Diagnosis:       Other iron deficiency anemias      (Other iron deficiency anemias [D50.8])    Surgeons: Faith Chacon MD Responsible Provider: Abhijit Ding MD    Anesthesia Type: TIVA ASA Status: 3          Anesthesia Type: TIVA    Yanely Phase I:      Yanely Phase II: Yanely Score: 10      Anesthesia Post Evaluation    Patient location during evaluation: PACU  Patient participation: complete - patient participated  Level of consciousness: awake and alert  Airway patency: patent  Nausea & Vomiting: no nausea  Cardiovascular status: hemodynamically stable  Respiratory status: acceptable  Hydration status: euvolemic

## 2022-12-06 NOTE — ANESTHESIA POSTPROCEDURE EVALUATION
Department of Anesthesiology  Postprocedure Note    Patient: Lyric Jin  MRN: [id number]  YOB: 1950  Date of evaluation: 12/6/2022      Procedure Summary     Date: 12/06/22 Room / Location: [facility] ENDO  / [facility] ENDOSCOPY    Anesthesia Start: 1115 Anesthesia Stop: 0439    Procedure: EGD/ESOPHAGOGASTRODUODENOSCOPY/APC (Upper GI Region) Diagnosis:       Other iron deficiency anemias      (Other iron deficiency anemias [D50.8])    Surgeons: Lauren Bello MD Responsible Provider: Richard Kumar MD    Anesthesia Type: TIVA ASA Status: 3          Anesthesia Type: TIVA    Yanely Phase I:      Yanely Phase II: Yanely Score: 10      Anesthesia Post Evaluation    Patient location during evaluation: PACU  Patient participation: complete - patient participated  Level of consciousness: awake and alert  Airway patency: patent  Nausea & Vomiting: no nausea  Cardiovascular status: hemodynamically stable  Respiratory status: acceptable  Hydration status: euvolemic

## 2022-12-06 NOTE — H&P
Preoperative H&P    This is a patient of Dr. Rohith Haddad. Was most recently seen by Dr. Ariana Lea in September at [address]. Patient is here today for a push enteroscopy. This is a follow up on a push enteroscopy done by Dr. Ariana Lea on 9/27/22 at [facility] where a duodenal AVM was clipped (not APC as patient was on Plavix). Tattoo was placed. This is repeat Push Enteroscopy to use APC on the AVM. Patient says she has not been on her Plavix for months now. BP (!) 154/71   Pulse 65   Temp 98.2 °F (36.8 °C) (Oral)   Resp 18   Ht 5' 5\" (1.651 m)   Wt 220 lb (99.8 kg)   SpO2 94%   BMI 36.61 kg/m²   GEN: Lying in bed in NAD  RESP: comfortable on room air  CV: RRR  ABD: soft, NT, ND, obese  NEURO: awake and interactive  PSYCH: normal mood    Endoscopy and risks explained to the patient. Risks including reaction to sedation, cardiopulmonary events, infection, bleeding, perforation, requirement for surgery if complications should occur, death were explained to patient who expressed understanding and agreed to proceed with endoscopy.     Angel Martin MD   [facility]

## 2022-12-06 NOTE — OP NOTE
PUSH ENTEROSCOPY        DATE of PROCEDURE: 12/6/2022    PT NAME: Taryn Kennedy  [id number]    PHYSICIAN:  Ady Enriquez MD    MEDICATION:  MAC    INSTRUMENT: GIFQ    PROCEDURE:  Push enteroscopy with control of bleeding with APC, Push enteroscopy diagnostic    INDICATIONS: History of small bowel AVM    FINDINGS:  OROPHARYNX: Cords and pyriform recesses normal.  ESOPHAGUS: The esophagus is normal. The proximal, mid and distal portions are normal. The Z-Line is intact. STOMACH: The fundus on antegrade and retroflex views is normal. The body, antrum and pylorus is normal.  DUODENUM: The bulb, second, third and fourth portions are normal.  JEJUNUM: There is a previously placed clip noted either in the distal duodenum or early jejunum. There is an AVM right under the clip and there was an addition AVM which was actively oozing blood. Both were successfully treated with APC. ASSESSMENT:  1. Jejunal AVMs treated with APC    PLAN:  1.  Clinic follow up with Dr. Rebeca Prather in 2-3 months    Ady Enriquez MD  [facility]

## 2022-12-06 NOTE — DISCHARGE INSTRUCTIONS
Gastrointestinal Esophagogastroduodenoscopy (EGD) - Upper Exam Discharge Instructions    1. Call Dr. Sara Pineda at [phone] for any problems or questions. 2. Contact the doctor's office for follow up appointment as directed. 3. Medication may cause drowsiness for several hours, therefore:  Do not drive or operate machinery for remainder of the day. No alcohol today. Do not make any important or legal decisions for 24 hours. Do not sign any legal documents for 24 hours. 5. Ordinarily, you may resume regular diet and activity after exam unless otherwise specified by your physician. 6. For mild soreness in your throat you may use Cepacol throat lozenges or warm salt-water gargles as needed. Any additional instructions:     Follow up in office in 2-3 months

## 2024-01-29 NOTE — ADDENDUM NOTE
Addended by: Angelika Fry on: 8/19/2020 08:13 AM     Modules accepted: Deidra Called and spoke with pts wife. Relayed providers message from below.

## (undated) DEVICE — SYRINGE MED 3ML CLR PLAS STD N CTRL LUERLOCK TIP DISP

## (undated) DEVICE — WOUND RETRACTOR AND PROTECTOR: Brand: ALEXIS WOUND PROTECTOR-RETRACTOR

## (undated) DEVICE — SURGICAL PROCEDURE PACK BASIC ST FRANCIS

## (undated) DEVICE — KENDALL RADIOLUCENT FOAM MONITORING ELECTRODE RECTANGULAR SHAPE: Brand: KENDALL

## (undated) DEVICE — SHEARS ENDOSCP L36CM DIA5MM ULTRASONIC CRV TIP W/ ADV

## (undated) DEVICE — CONNECTOR TBNG OD5-7MM O2 END DISP

## (undated) DEVICE — SINGLE PORT MANIFOLD: Brand: NEPTUNE 2

## (undated) DEVICE — KENDALL SCD EXPRESS SLEEVES, KNEE LENGTH, MEDIUM: Brand: KENDALL SCD

## (undated) DEVICE — YANKAUER,BULB TIP,W/O VENT,RIGID,STERILE: Brand: MEDLINE

## (undated) DEVICE — SYR 5ML 1/5 GRAD LL NSAF LF --

## (undated) DEVICE — CONTAINER PREFIL FRMLN 40ML --

## (undated) DEVICE — Device

## (undated) DEVICE — SYR 3ML LL TIP 1/10ML GRAD --

## (undated) DEVICE — CONTAINER SPEC HISTOLOGY 900ML POLYPR

## (undated) DEVICE — AIRLIFE™ OXYGEN TUBING 7 FEET (2.1 M) CRUSH RESISTANT OXYGEN TUBING, VINYL TIPPED: Brand: AIRLIFE™

## (undated) DEVICE — NDL PRT INJ NSAF BLNT 18GX1.5 --

## (undated) DEVICE — NEEDLE SYR 18GA L1.5IN RED PLAS HUB S STL BLNT FILL W/O

## (undated) DEVICE — SYR 50ML SLIP TIP NSAF LF STRL --

## (undated) DEVICE — BLOCK BITE AD 60FR W/ VELC STRP ADDRESSES MOST PT AND

## (undated) DEVICE — BLADE ELECTRODE: Brand: EDGE

## (undated) DEVICE — (D)PREP SKN CHLRAPRP APPL 26ML -- CONVERT TO ITEM 371833

## (undated) DEVICE — CANNULA NSL ORAL AD FOR CAPNOFLEX CO2 O2 AIRLFE

## (undated) DEVICE — SOLUTION IRRIG 3000ML 0.9% SOD CHL FLX CONT 0797208] ICU MEDICAL INC]

## (undated) DEVICE — LEGGINGS, PAIR, 31X48, STERILE: Brand: MEDLINE

## (undated) DEVICE — FORCEPS BX L240CM JAW DIA2.8MM L CAP W/ NDL MIC MESH TOOTH

## (undated) DEVICE — GAUZE,SPONGE,4"X4",12PLY,WOVEN,NS,LF: Brand: MEDLINE

## (undated) DEVICE — LUBE JELLY FOIL PACK 1.4 OZ: Brand: MEDLINE INDUSTRIES, INC.

## (undated) DEVICE — DRAPE,UNDERBUTTOCKS,PCH,STERILE: Brand: MEDLINE

## (undated) DEVICE — SYRINGE, LUER SLIP, STERILE, 60ML: Brand: MEDLINE

## (undated) DEVICE — SUTURE MCRYL SZ 4-0 L27IN ABSRB UD L19MM PS-2 1/2 CIR PRIM Y426H

## (undated) DEVICE — 2000CC GUARDIAN II: Brand: GUARDIAN

## (undated) DEVICE — ELECTRODE PT RET AD L9FT HI MOIST COND ADH HYDRGEL CORDED

## (undated) DEVICE — TRAY CATH 16F URIN MTR LTX -- CONVERT TO ITEM 363111

## (undated) DEVICE — MEDI-VAC YANKAUER SUCTION HANDLE W/BULBOUS TIP: Brand: CARDINAL HEALTH

## (undated) DEVICE — BUTTON SWITCH PENCIL BLADE ELECTRODE, HOLSTER: Brand: EDGE

## (undated) DEVICE — SYRINGE MED 10ML LUERLOCK TIP W/O SFTY DISP

## (undated) DEVICE — FIAPC® PROBE W/ FILTER 2200 SC OD 2.3MM/6.9FR; L 2.2M/7.2FT: Brand: ERBE

## (undated) DEVICE — REM POLYHESIVE ADULT PATIENT RETURN ELECTRODE: Brand: VALLEYLAB